# Patient Record
Sex: FEMALE | Race: BLACK OR AFRICAN AMERICAN | NOT HISPANIC OR LATINO
[De-identification: names, ages, dates, MRNs, and addresses within clinical notes are randomized per-mention and may not be internally consistent; named-entity substitution may affect disease eponyms.]

---

## 2020-11-21 ENCOUNTER — ASOB RESULT (OUTPATIENT)
Age: 30
End: 2020-11-21

## 2020-11-21 ENCOUNTER — APPOINTMENT (OUTPATIENT)
Dept: ANTEPARTUM | Facility: CLINIC | Age: 30
End: 2020-11-21
Payer: MEDICAID

## 2020-11-21 ENCOUNTER — TRANSCRIPTION ENCOUNTER (OUTPATIENT)
Age: 30
End: 2020-11-21

## 2020-11-21 PROBLEM — Z00.00 ENCOUNTER FOR PREVENTIVE HEALTH EXAMINATION: Status: ACTIVE | Noted: 2020-11-21

## 2020-11-21 PROCEDURE — 76813 OB US NUCHAL MEAS 1 GEST: CPT

## 2020-11-21 PROCEDURE — 99072 ADDL SUPL MATRL&STAF TM PHE: CPT

## 2020-11-21 PROCEDURE — 36416 COLLJ CAPILLARY BLOOD SPEC: CPT

## 2020-11-21 PROCEDURE — 76801 OB US < 14 WKS SINGLE FETUS: CPT

## 2021-01-22 ENCOUNTER — ASOB RESULT (OUTPATIENT)
Age: 31
End: 2021-01-22

## 2021-01-22 ENCOUNTER — APPOINTMENT (OUTPATIENT)
Dept: ANTEPARTUM | Facility: CLINIC | Age: 31
End: 2021-01-22
Payer: MEDICAID

## 2021-01-22 PROCEDURE — 76811 OB US DETAILED SNGL FETUS: CPT

## 2021-01-22 PROCEDURE — 99072 ADDL SUPL MATRL&STAF TM PHE: CPT

## 2021-05-10 ENCOUNTER — OUTPATIENT (OUTPATIENT)
Dept: INPATIENT UNIT | Facility: HOSPITAL | Age: 31
LOS: 1 days | Discharge: HOME | End: 2021-05-10
Payer: MEDICAID

## 2021-05-10 VITALS
DIASTOLIC BLOOD PRESSURE: 78 MMHG | HEART RATE: 112 BPM | RESPIRATION RATE: 16 BRPM | SYSTOLIC BLOOD PRESSURE: 135 MMHG | TEMPERATURE: 98 F

## 2021-05-10 VITALS — OXYGEN SATURATION: 90 % | HEART RATE: 92 BPM

## 2021-05-10 DIAGNOSIS — Z98.890 OTHER SPECIFIED POSTPROCEDURAL STATES: Chronic | ICD-10-CM

## 2021-05-10 DIAGNOSIS — O26.899 OTHER SPECIFIED PREGNANCY RELATED CONDITIONS, UNSPECIFIED TRIMESTER: ICD-10-CM

## 2021-05-10 DIAGNOSIS — Z3A.00 WEEKS OF GESTATION OF PREGNANCY NOT SPECIFIED: ICD-10-CM

## 2021-05-10 LAB
ALBUMIN SERPL ELPH-MCNC: 3.6 G/DL — SIGNIFICANT CHANGE UP (ref 3.3–5)
ALP SERPL-CCNC: 101 U/L — SIGNIFICANT CHANGE UP (ref 40–120)
ALT FLD-CCNC: 9 U/L — SIGNIFICANT CHANGE UP (ref 4–33)
ANION GAP SERPL CALC-SCNC: 13 MMOL/L — SIGNIFICANT CHANGE UP (ref 7–14)
AST SERPL-CCNC: 11 U/L — SIGNIFICANT CHANGE UP (ref 4–32)
BASOPHILS # BLD AUTO: 0.02 K/UL — SIGNIFICANT CHANGE UP (ref 0–0.2)
BASOPHILS NFR BLD AUTO: 0.1 % — SIGNIFICANT CHANGE UP (ref 0–2)
BILIRUB SERPL-MCNC: 0.2 MG/DL — SIGNIFICANT CHANGE UP (ref 0.2–1.2)
BUN SERPL-MCNC: 7 MG/DL — SIGNIFICANT CHANGE UP (ref 7–23)
CALCIUM SERPL-MCNC: 8.6 MG/DL — SIGNIFICANT CHANGE UP (ref 8.4–10.5)
CHLORIDE SERPL-SCNC: 102 MMOL/L — SIGNIFICANT CHANGE UP (ref 98–107)
CO2 SERPL-SCNC: 21 MMOL/L — LOW (ref 22–31)
CREAT SERPL-MCNC: 0.69 MG/DL — SIGNIFICANT CHANGE UP (ref 0.5–1.3)
EOSINOPHIL # BLD AUTO: 0.08 K/UL — SIGNIFICANT CHANGE UP (ref 0–0.5)
EOSINOPHIL NFR BLD AUTO: 0.6 % — SIGNIFICANT CHANGE UP (ref 0–6)
GLUCOSE SERPL-MCNC: 88 MG/DL — SIGNIFICANT CHANGE UP (ref 70–99)
HCT VFR BLD CALC: 31.1 % — LOW (ref 34.5–45)
HGB BLD-MCNC: 9.7 G/DL — LOW (ref 11.5–15.5)
IANC: 9.72 K/UL — HIGH (ref 1.5–8.5)
IMM GRANULOCYTES NFR BLD AUTO: 0.7 % — SIGNIFICANT CHANGE UP (ref 0–1.5)
LYMPHOCYTES # BLD AUTO: 18.4 % — SIGNIFICANT CHANGE UP (ref 13–44)
LYMPHOCYTES # BLD AUTO: 2.47 K/UL — SIGNIFICANT CHANGE UP (ref 1–3.3)
MCHC RBC-ENTMCNC: 29 PG — SIGNIFICANT CHANGE UP (ref 27–34)
MCHC RBC-ENTMCNC: 31.2 GM/DL — LOW (ref 32–36)
MCV RBC AUTO: 92.8 FL — SIGNIFICANT CHANGE UP (ref 80–100)
MONOCYTES # BLD AUTO: 1.06 K/UL — HIGH (ref 0–0.9)
MONOCYTES NFR BLD AUTO: 7.9 % — SIGNIFICANT CHANGE UP (ref 2–14)
NEUTROPHILS # BLD AUTO: 9.72 K/UL — HIGH (ref 1.8–7.4)
NEUTROPHILS NFR BLD AUTO: 72.3 % — SIGNIFICANT CHANGE UP (ref 43–77)
NRBC # BLD: 0 /100 WBCS — SIGNIFICANT CHANGE UP
NRBC # FLD: 0 K/UL — SIGNIFICANT CHANGE UP
PLATELET # BLD AUTO: 296 K/UL — SIGNIFICANT CHANGE UP (ref 150–400)
POTASSIUM SERPL-MCNC: 3.8 MMOL/L — SIGNIFICANT CHANGE UP (ref 3.5–5.3)
POTASSIUM SERPL-SCNC: 3.8 MMOL/L — SIGNIFICANT CHANGE UP (ref 3.5–5.3)
PROT SERPL-MCNC: 6.8 G/DL — SIGNIFICANT CHANGE UP (ref 6–8.3)
RBC # BLD: 3.35 M/UL — LOW (ref 3.8–5.2)
RBC # FLD: 13.2 % — SIGNIFICANT CHANGE UP (ref 10.3–14.5)
SODIUM SERPL-SCNC: 136 MMOL/L — SIGNIFICANT CHANGE UP (ref 135–145)
WBC # BLD: 13.45 K/UL — HIGH (ref 3.8–10.5)
WBC # FLD AUTO: 13.45 K/UL — HIGH (ref 3.8–10.5)

## 2021-05-10 PROCEDURE — 99214 OFFICE O/P EST MOD 30 MIN: CPT | Mod: 25

## 2021-05-10 PROCEDURE — 59025 FETAL NON-STRESS TEST: CPT | Mod: 26

## 2021-05-10 PROCEDURE — 93010 ELECTROCARDIOGRAM REPORT: CPT

## 2021-05-10 RX ORDER — ACETAMINOPHEN 500 MG
975 TABLET ORAL ONCE
Refills: 0 | Status: COMPLETED | OUTPATIENT
Start: 2021-05-10 | End: 2021-05-10

## 2021-05-10 RX ORDER — SIMETHICONE 80 MG/1
160 TABLET, CHEWABLE ORAL ONCE
Refills: 0 | Status: COMPLETED | OUTPATIENT
Start: 2021-05-10 | End: 2021-05-10

## 2021-05-10 RX ORDER — CITRIC ACID/SODIUM CITRATE 300-500 MG
30 SOLUTION, ORAL ORAL ONCE
Refills: 0 | Status: COMPLETED | OUTPATIENT
Start: 2021-05-10 | End: 2021-05-10

## 2021-05-10 RX ADMIN — Medication 30 MILLILITER(S): at 13:47

## 2021-05-10 RX ADMIN — Medication 975 MILLIGRAM(S): at 13:47

## 2021-05-10 RX ADMIN — Medication 975 MILLIGRAM(S): at 14:37

## 2021-05-10 RX ADMIN — SIMETHICONE 160 MILLIGRAM(S): 80 TABLET, CHEWABLE ORAL at 15:37

## 2021-05-10 NOTE — OB PROVIDER TRIAGE NOTE - NSOBPROVIDERNOTE_OBGYN_ALL_OB_FT
EKG ordered  cbc, cmp ordered  tylenol 95mg PO, bicitra 30 mg ordered EKG ordered  cbc, cmp ordered  tylenol 95mg PO, bicitra 30 mg ordered  simethicone 160 mg PO ordered EKG ordered  cbc, cmp ordered  tylenol 95mg PO, bicitra 30 mg ordered  simethicone 160 mg PO ordered    1625:  pt reports minimal relief with the simethicone  d/w Dr. Anaya  pt discharged home - pain likely related to gerd/gas pain  continue pepcid/gas x at home  keep next scheduled appointment on Thursday  daily kick counts reviewed  labor precautions reviewed

## 2021-05-10 NOTE — OB PROVIDER TRIAGE NOTE - NSHPLABSRESULTS_GEN_ALL_CORE
EKG - normal sinus rhythm, normal ECG EKG - normal sinus rhythm, normal ECG                          9.7    13.45 )-----------( 296      ( 10 May 2021 14:24 )             31.1     05-10    136  |  102  |  7   ----------------------------<  88  3.8   |  21<L>  |  0.69    Ca    8.6      10 May 2021 14:24    TPro  6.8  /  Alb  3.6  /  TBili  0.2  /  DBili  x   /  AST  11  /  ALT  9   /  AlkPhos  101  05-10

## 2021-05-10 NOTE — OB PROVIDER TRIAGE NOTE - NS_CHIEFCOMPLAINTOTHER_OBGYN_ALL_OB_FT
Sent from office for evaluation for decrease in fetal movement x 2. Reports she was at office today and had +FH x 2. Denies LOF, VB and reports GFM. Presents with c/o mid chest pain which radiates to back and shoulder x 3 days. Denies LOF, VB and reports GFM.

## 2021-05-10 NOTE — OB PROVIDER TRIAGE NOTE - ADDITIONAL INSTRUCTIONS
labor precautions reviewed  daily kick counts reviewed  f/u with next scheduled appointment  pepcid and gas x at home

## 2021-05-25 ENCOUNTER — OUTPATIENT (OUTPATIENT)
Dept: OUTPATIENT SERVICES | Facility: HOSPITAL | Age: 31
LOS: 1 days | Discharge: HOME | End: 2021-05-25
Payer: MEDICAID

## 2021-05-25 VITALS — SYSTOLIC BLOOD PRESSURE: 121 MMHG | HEART RATE: 65 BPM | DIASTOLIC BLOOD PRESSURE: 82 MMHG

## 2021-05-25 VITALS — RESPIRATION RATE: 16 BRPM | TEMPERATURE: 98 F

## 2021-05-25 DIAGNOSIS — O26.899 OTHER SPECIFIED PREGNANCY RELATED CONDITIONS, UNSPECIFIED TRIMESTER: ICD-10-CM

## 2021-05-25 DIAGNOSIS — Z3A.00 WEEKS OF GESTATION OF PREGNANCY NOT SPECIFIED: ICD-10-CM

## 2021-05-25 DIAGNOSIS — Z98.890 OTHER SPECIFIED POSTPROCEDURAL STATES: Chronic | ICD-10-CM

## 2021-05-25 PROCEDURE — 76818 FETAL BIOPHYS PROFILE W/NST: CPT | Mod: 26

## 2021-05-25 PROCEDURE — 99214 OFFICE O/P EST MOD 30 MIN: CPT | Mod: 25

## 2021-05-25 RX ORDER — ACETAMINOPHEN 500 MG
975 TABLET ORAL ONCE
Refills: 0 | Status: COMPLETED | OUTPATIENT
Start: 2021-05-25 | End: 2021-05-25

## 2021-05-25 RX ADMIN — Medication 975 MILLIGRAM(S): at 02:58

## 2021-05-25 NOTE — OB PROVIDER TRIAGE NOTE - ADDITIONAL INSTRUCTIONS
-Patient will follow up with next scheduled appointment  -Fetal kick counts reviewed with patient  -labor precautions reviewed with patient

## 2021-05-25 NOTE — OB PROVIDER TRIAGE NOTE - NSHPPHYSICALEXAM_GEN_ALL_CORE
Vital Signs Last 24 Hrs  T(C): 36.9 (25 May 2021 02:24), Max: 36.9 (25 May 2021 02:20)  T(F): 98.4 (25 May 2021 02:24), Max: 98.42 (25 May 2021 02:20)  HR: 83 (25 May 2021 02:53) (83 - 86)  BP: 142/80 (25 May 2021 02:53) (120/76 - 142/80)  BP(mean): --  RR: 16 (25 May 2021 02:24) (16 - 16)  SpO2: --    Abdomen: soft, non tender. no guarding or rebound tenderness  SVE: 3/50/-3  TAS: vertex presentation    NST reactive with moderate variability, cat 1  toco ctx 6-11 minutes

## 2021-05-25 NOTE — OB RN TRIAGE NOTE - LIVING CHILDREN, OB PROFILE
2 Partial Purse String (Simple) Text: Given the location of the defect and the characteristics of the surrounding skin a simple purse string closure was deemed most appropriate.  Undermining was performed circumferentially around the surgical defect.  A purse string suture was then placed and tightened. Wound tension of the circular defect prevented complete closure of the wound.

## 2021-05-25 NOTE — OB PROVIDER TRIAGE NOTE - HISTORY OF PRESENT ILLNESS
31 y/o pt 39.2 weeks  presents to triage with c/o irregular, painful contractions and increased pelvic pressure. pt reports 6/10 pain with contractions. pt denies LOF and bleeding. pt denies n/v/d, fever or chills. pt endorses +fetal movement. pt reports that SVE on 21 was 3m. pt also reports HA since yesterday. pt denies any visual disturbances or epigastric pain, pt denies any elevated blood pressures during pregnancy  AP uncomplicated thus far    NKDA  PMH: denies   PSH: denies  OB:    2008 FT 7#   03/15/2014 FT 8#5 PEC  SAB x4 incomplete d&c x4  GYN: denies  Social hx: denies  medications:  PNV  Ferrous sulfate  Aspirin 81mg

## 2021-05-25 NOTE — OB PROVIDER TRIAGE NOTE - NSOBPROVIDERNOTE_OBGYN_ALL_OB_FT
29 y/o pt 39.2 weeks  presents in early labor  Plan:  -Tylenol 975mg given for HA  -will monitor blood pressures 31 y/o pt 39.2 weeks  presents in early labor  Plan:  -Tylenol 975mg given for HA  -will monitor blood pressures    @0348  Pt reports pain relief from Tylenol, pt reports 4/10 pain  No evidence of active labor   Maternal and fetal status reassuring  d/w Dr Schultz   -Patient cleared for discharge  -Patient will follow up with next scheduled appointment  -Fetal kick counts reviewed with patient  -labor precautions reviewed with patient  -Written and verbal instructions given to patient, patient verbalizes understanding of all information given

## 2021-05-27 ENCOUNTER — TRANSCRIPTION ENCOUNTER (OUTPATIENT)
Age: 31
End: 2021-05-27

## 2021-05-27 ENCOUNTER — INPATIENT (INPATIENT)
Facility: HOSPITAL | Age: 31
LOS: 0 days | Discharge: HOME | End: 2021-05-28
Attending: OBSTETRICS & GYNECOLOGY | Admitting: OBSTETRICS & GYNECOLOGY

## 2021-05-27 VITALS
DIASTOLIC BLOOD PRESSURE: 67 MMHG | TEMPERATURE: 99 F | HEART RATE: 92 BPM | RESPIRATION RATE: 16 BRPM | SYSTOLIC BLOOD PRESSURE: 122 MMHG

## 2021-05-27 DIAGNOSIS — Z3A.00 WEEKS OF GESTATION OF PREGNANCY NOT SPECIFIED: ICD-10-CM

## 2021-05-27 DIAGNOSIS — O26.899 OTHER SPECIFIED PREGNANCY RELATED CONDITIONS, UNSPECIFIED TRIMESTER: ICD-10-CM

## 2021-05-27 DIAGNOSIS — Z98.890 OTHER SPECIFIED POSTPROCEDURAL STATES: Chronic | ICD-10-CM

## 2021-05-27 LAB
BASOPHILS # BLD AUTO: 0.04 K/UL — SIGNIFICANT CHANGE UP (ref 0–0.2)
BASOPHILS NFR BLD AUTO: 0.3 % — SIGNIFICANT CHANGE UP (ref 0–2)
BLD GP AB SCN SERPL QL: NEGATIVE — SIGNIFICANT CHANGE UP
COVID-19 SPIKE DOMAIN AB INTERP: NEGATIVE — SIGNIFICANT CHANGE UP
COVID-19 SPIKE DOMAIN ANTIBODY RESULT: 0.4 U/ML — SIGNIFICANT CHANGE UP
EOSINOPHIL # BLD AUTO: 0.05 K/UL — SIGNIFICANT CHANGE UP (ref 0–0.5)
EOSINOPHIL NFR BLD AUTO: 0.3 % — SIGNIFICANT CHANGE UP (ref 0–6)
HCT VFR BLD CALC: 31.3 % — LOW (ref 34.5–45)
HGB BLD-MCNC: 10.2 G/DL — LOW (ref 11.5–15.5)
IANC: 10.42 K/UL — HIGH (ref 1.5–8.5)
IMM GRANULOCYTES NFR BLD AUTO: 1 % — SIGNIFICANT CHANGE UP (ref 0–1.5)
LYMPHOCYTES # BLD AUTO: 18.6 % — SIGNIFICANT CHANGE UP (ref 13–44)
LYMPHOCYTES # BLD AUTO: 2.74 K/UL — SIGNIFICANT CHANGE UP (ref 1–3.3)
MCHC RBC-ENTMCNC: 29.3 PG — SIGNIFICANT CHANGE UP (ref 27–34)
MCHC RBC-ENTMCNC: 32.6 GM/DL — SIGNIFICANT CHANGE UP (ref 32–36)
MCV RBC AUTO: 89.9 FL — SIGNIFICANT CHANGE UP (ref 80–100)
MONOCYTES # BLD AUTO: 1.32 K/UL — HIGH (ref 0–0.9)
MONOCYTES NFR BLD AUTO: 9 % — SIGNIFICANT CHANGE UP (ref 2–14)
NEUTROPHILS # BLD AUTO: 10.42 K/UL — HIGH (ref 1.8–7.4)
NEUTROPHILS NFR BLD AUTO: 70.8 % — SIGNIFICANT CHANGE UP (ref 43–77)
NRBC # BLD: 0 /100 WBCS — SIGNIFICANT CHANGE UP
NRBC # FLD: 0 K/UL — SIGNIFICANT CHANGE UP
PLATELET # BLD AUTO: 271 K/UL — SIGNIFICANT CHANGE UP (ref 150–400)
RBC # BLD: 3.48 M/UL — LOW (ref 3.8–5.2)
RBC # FLD: 13.3 % — SIGNIFICANT CHANGE UP (ref 10.3–14.5)
RH IG SCN BLD-IMP: POSITIVE — SIGNIFICANT CHANGE UP
RH IG SCN BLD-IMP: POSITIVE — SIGNIFICANT CHANGE UP
SARS-COV-2 IGG+IGM SERPL QL IA: 0.4 U/ML — SIGNIFICANT CHANGE UP
SARS-COV-2 IGG+IGM SERPL QL IA: NEGATIVE — SIGNIFICANT CHANGE UP
SARS-COV-2 RNA SPEC QL NAA+PROBE: SIGNIFICANT CHANGE UP
T PALLIDUM AB TITR SER: NEGATIVE — SIGNIFICANT CHANGE UP
WBC # BLD: 14.71 K/UL — HIGH (ref 3.8–10.5)
WBC # FLD AUTO: 14.71 K/UL — HIGH (ref 3.8–10.5)

## 2021-05-27 RX ORDER — AER TRAVELER 0.5 G/1
1 SOLUTION RECTAL; TOPICAL EVERY 4 HOURS
Refills: 0 | Status: DISCONTINUED | OUTPATIENT
Start: 2021-05-27 | End: 2021-05-28

## 2021-05-27 RX ORDER — SODIUM CHLORIDE 9 MG/ML
1000 INJECTION, SOLUTION INTRAVENOUS
Refills: 0 | Status: DISCONTINUED | OUTPATIENT
Start: 2021-05-27 | End: 2021-05-27

## 2021-05-27 RX ORDER — ACETAMINOPHEN 500 MG
3 TABLET ORAL
Qty: 0 | Refills: 0 | DISCHARGE
Start: 2021-05-27

## 2021-05-27 RX ORDER — SENNA PLUS 8.6 MG/1
1 TABLET ORAL
Refills: 0 | Status: DISCONTINUED | OUTPATIENT
Start: 2021-05-27 | End: 2021-05-28

## 2021-05-27 RX ORDER — TETANUS TOXOID, REDUCED DIPHTHERIA TOXOID AND ACELLULAR PERTUSSIS VACCINE, ADSORBED 5; 2.5; 8; 8; 2.5 [IU]/.5ML; [IU]/.5ML; UG/.5ML; UG/.5ML; UG/.5ML
0.5 SUSPENSION INTRAMUSCULAR ONCE
Refills: 0 | Status: DISCONTINUED | OUTPATIENT
Start: 2021-05-27 | End: 2021-05-28

## 2021-05-27 RX ORDER — IBUPROFEN 200 MG
600 TABLET ORAL EVERY 6 HOURS
Refills: 0 | Status: DISCONTINUED | OUTPATIENT
Start: 2021-05-27 | End: 2021-05-28

## 2021-05-27 RX ORDER — LANOLIN
1 OINTMENT (GRAM) TOPICAL EVERY 6 HOURS
Refills: 0 | Status: DISCONTINUED | OUTPATIENT
Start: 2021-05-27 | End: 2021-05-28

## 2021-05-27 RX ORDER — OXYTOCIN 10 UNIT/ML
333.33 VIAL (ML) INJECTION
Qty: 20 | Refills: 0 | Status: DISCONTINUED | OUTPATIENT
Start: 2021-05-27 | End: 2021-05-27

## 2021-05-27 RX ORDER — SODIUM CHLORIDE 9 MG/ML
3 INJECTION INTRAMUSCULAR; INTRAVENOUS; SUBCUTANEOUS EVERY 8 HOURS
Refills: 0 | Status: DISCONTINUED | OUTPATIENT
Start: 2021-05-27 | End: 2021-05-28

## 2021-05-27 RX ORDER — SODIUM CHLORIDE 9 MG/ML
1000 INJECTION INTRAMUSCULAR; INTRAVENOUS; SUBCUTANEOUS
Refills: 0 | Status: DISCONTINUED | OUTPATIENT
Start: 2021-05-27 | End: 2021-05-27

## 2021-05-27 RX ORDER — BENZOYL PEROXIDE MICRONIZED 5.8 %
1 TOWELETTE (EA) TOPICAL
Qty: 0 | Refills: 0 | DISCHARGE

## 2021-05-27 RX ORDER — OXYCODONE HYDROCHLORIDE 5 MG/1
5 TABLET ORAL ONCE
Refills: 0 | Status: DISCONTINUED | OUTPATIENT
Start: 2021-05-27 | End: 2021-05-28

## 2021-05-27 RX ORDER — SIMETHICONE 80 MG/1
80 TABLET, CHEWABLE ORAL EVERY 4 HOURS
Refills: 0 | Status: DISCONTINUED | OUTPATIENT
Start: 2021-05-27 | End: 2021-05-28

## 2021-05-27 RX ORDER — ACETAMINOPHEN 500 MG
975 TABLET ORAL
Refills: 0 | Status: DISCONTINUED | OUTPATIENT
Start: 2021-05-27 | End: 2021-05-28

## 2021-05-27 RX ORDER — SODIUM CHLORIDE 9 MG/ML
500 INJECTION INTRAMUSCULAR; INTRAVENOUS; SUBCUTANEOUS ONCE
Refills: 0 | Status: COMPLETED | OUTPATIENT
Start: 2021-05-27 | End: 2021-05-27

## 2021-05-27 RX ORDER — PRAMOXINE HYDROCHLORIDE 150 MG/15G
1 AEROSOL, FOAM RECTAL EVERY 4 HOURS
Refills: 0 | Status: DISCONTINUED | OUTPATIENT
Start: 2021-05-27 | End: 2021-05-28

## 2021-05-27 RX ORDER — BENZOCAINE 10 %
1 GEL (GRAM) MUCOUS MEMBRANE EVERY 6 HOURS
Refills: 0 | Status: DISCONTINUED | OUTPATIENT
Start: 2021-05-27 | End: 2021-05-28

## 2021-05-27 RX ORDER — OXYCODONE HYDROCHLORIDE 5 MG/1
5 TABLET ORAL
Refills: 0 | Status: DISCONTINUED | OUTPATIENT
Start: 2021-05-27 | End: 2021-05-28

## 2021-05-27 RX ORDER — DIPHENHYDRAMINE HCL 50 MG
25 CAPSULE ORAL EVERY 6 HOURS
Refills: 0 | Status: DISCONTINUED | OUTPATIENT
Start: 2021-05-27 | End: 2021-05-28

## 2021-05-27 RX ORDER — MAGNESIUM HYDROXIDE 400 MG/1
30 TABLET, CHEWABLE ORAL
Refills: 0 | Status: DISCONTINUED | OUTPATIENT
Start: 2021-05-27 | End: 2021-05-28

## 2021-05-27 RX ORDER — KETOROLAC TROMETHAMINE 30 MG/ML
30 SYRINGE (ML) INJECTION ONCE
Refills: 0 | Status: DISCONTINUED | OUTPATIENT
Start: 2021-05-27 | End: 2021-05-27

## 2021-05-27 RX ORDER — IBUPROFEN 200 MG
600 TABLET ORAL EVERY 6 HOURS
Refills: 0 | Status: COMPLETED | OUTPATIENT
Start: 2021-05-27 | End: 2022-04-25

## 2021-05-27 RX ORDER — DIBUCAINE 1 %
1 OINTMENT (GRAM) RECTAL EVERY 6 HOURS
Refills: 0 | Status: DISCONTINUED | OUTPATIENT
Start: 2021-05-27 | End: 2021-05-28

## 2021-05-27 RX ORDER — IBUPROFEN 200 MG
1 TABLET ORAL
Qty: 0 | Refills: 0 | DISCHARGE
Start: 2021-05-27

## 2021-05-27 RX ORDER — HYDROCORTISONE 1 %
1 OINTMENT (GRAM) TOPICAL EVERY 6 HOURS
Refills: 0 | Status: DISCONTINUED | OUTPATIENT
Start: 2021-05-27 | End: 2021-05-28

## 2021-05-27 RX ADMIN — Medication 975 MILLIGRAM(S): at 21:00

## 2021-05-27 RX ADMIN — Medication 600 MILLIGRAM(S): at 23:40

## 2021-05-27 RX ADMIN — SODIUM CHLORIDE 125 MILLILITER(S): 9 INJECTION INTRAMUSCULAR; INTRAVENOUS; SUBCUTANEOUS at 05:26

## 2021-05-27 RX ADMIN — Medication 600 MILLIGRAM(S): at 15:45

## 2021-05-27 RX ADMIN — SODIUM CHLORIDE 1000 MILLILITER(S): 9 INJECTION INTRAMUSCULAR; INTRAVENOUS; SUBCUTANEOUS at 05:02

## 2021-05-27 RX ADMIN — Medication 975 MILLIGRAM(S): at 20:30

## 2021-05-27 RX ADMIN — Medication 600 MILLIGRAM(S): at 17:03

## 2021-05-27 RX ADMIN — Medication 1000 MILLIUNIT(S)/MIN: at 05:41

## 2021-05-27 RX ADMIN — Medication 30 MILLIGRAM(S): at 07:59

## 2021-05-27 RX ADMIN — Medication 30 MILLIGRAM(S): at 06:42

## 2021-05-27 NOTE — OB PROVIDER TRIAGE NOTE - HISTORY OF PRESENT ILLNESS
29 y/o pt 39.4 weeks  presents to triage with c/o painful contractions every 5 minutes. pt reports 8/10 pain with contraction. pt denies any lof or bleeding. pt denies n/v/d, fever or chills. pt endorses +fetal movement  AP uncomplicated thus far    NKDA  PMH:   Migraines  PSH: denies  OB:   2008 FT 7#   03/15/2015 FT 8#5  SAB x3 incomplete D&C x3   GYN: denies  Social hx: denies  Medications: PNV  Ferrous sulfate BID

## 2021-05-27 NOTE — OB RN DELIVERY SUMMARY - NSSELHIDDEN_OBGYN_ALL_OB_FT
[NS_DeliveryAttending1_OBGYN_ALL_OB_FT:[email or web address]=],[NS_DeliveryRN_OBGYN_ALL_OB_FT:[id number]=]

## 2021-05-27 NOTE — DISCHARGE NOTE OB - PATIENT PORTAL LINK FT
You can access the FollowMyHealth Patient Portal offered by [facility] by registering at the following website: [email or web address]. By joining [employer]’s FollowMyHealth portal, you will also be able to view your health information using other applications (apps) compatible with our system.

## 2021-05-27 NOTE — OB RN DELIVERY SUMMARY - NS_SEPSISRSKCALC_OBGYN_ALL_OB_FT
EOS calculated successfully. EOS Risk Factor: 0.5/1000 live births ([facility] national incidence); GA=39w4d; Temp=98.8; ROM=1.933; GBS='Negative'; Antibiotics='No antibiotics or any antibiotics < 2 hrs prior to birth'

## 2021-05-27 NOTE — DISCHARGE NOTE OB - MATERIALS PROVIDED
Vaccinations/[facility]  Screening Program/  Immunization Record/Breastfeeding Log/Guide to Postpartum Care/[facility] Hearing Screen Program/Back To Sleep Handout/Shaken Baby Prevention Handout/Tdap Vaccination (VIS Pub Date: 2012)

## 2021-05-27 NOTE — OB PROVIDER TRIAGE NOTE - NSHPPHYSICALEXAM_GEN_ALL_CORE
Vital Signs Last 24 Hrs  T(C): 37.1 (27 May 2021 00:59), Max: 37.1 (27 May 2021 00:59)  T(F): 98.8 (27 May 2021 00:59), Max: 98.8 (27 May 2021 00:59)  HR: 92 (27 May 2021 01:09) (92 - 92)  BP: 122/67 (27 May 2021 01:09) (122/67 - 122/67)  BP(mean): --  RR: 16 (27 May 2021 00:59) (16 - 16)  SpO2: --    Abdomen: soft, non tender. no guarding or rebound tenderness  SVE: 4/70/-3  TAS: vertex presentation  EFW 3317gm by Leopold's     NST in progress

## 2021-05-27 NOTE — OB RN PATIENT PROFILE - NS PRO TDAP RECEIVED Y/N
Problem: Goal Outcome Summary  Goal: Goal Outcome Summary  Outcome: Improving  A&Ox4. VSS on room air. Up with assist x1. Creatinine trending down- 1.31. Tolerating renal diet. , 170. SSI as ordered. Voiding in urinal at bedside. Adequate output. Need stool sample- hats in toilet. IVF infusing at 75mL/hr. ECHO yesterday showing decreased heart function. Cardiology consulted. Plan for discharge in 1-2 days pending renal function and input from cardiologist. Will continue to monitor.          yes...

## 2021-05-27 NOTE — DISCHARGE NOTE OB - CARE PROVIDER_API CALL
Armond Anaya)  Obstetrics and Gynecology Obstetrics and Gynocology  [address] [place] [address]  Phone: [phone]  Fax: [phone]  Follow Up Time: 1 month

## 2021-05-27 NOTE — OB PROVIDER DELIVERY SUMMARY - NSPROVIDERDELIVERYNOTE_OBGYN_ALL_OB_FT
Delivery of liveborn male infant from JEROME position complicated by left shoulder dystocia. Bed was lowered, shoulder dystocia code was called, patient was placed in Isaiah position and delivery of posterior arm relieved shoulder dystocia, lasting less than 10 seconds. Head, shoulders, and body delivered tight nuchal x 2.  Infant was suctioned. No mec. Delayed cord clamping and infant was passed to mother. Cord clamped and cut. Placenta delivered intact with a 3 vessel cord. Fundal massage was given and uterine fundus was found to be firm. Vaginal exam revealed an intact cervix, vaginal walls and sulci. Excellent hemostasis was noted. Patient was stable and went to recovery. Count was correct x 2.

## 2021-05-27 NOTE — OB NEONATOLOGY/PEDIATRICIAN DELIVERY SUMMARY - NSPEDSNEONOTESA_OBGYN_ALL_OB_FT
Baby Alvin Couch was born at 39.4 wks via  to a 31 y/o O+ . COVID-19 pending. Maternal history of migraines. Pregnancy/prenatal history unremarkable. PNL neg/NR/immune. GBS neg on 4/15. Antibiotics were not given. AROM at 2 hours with clear fluid. Neonatology called for possible shoulder dystocia. Baby born crying and vigorous, and was warmed, dried, stimulated, and suctioned. Baby was noted to have symmetric upper extremities, moving symmetrically also. APGARS 8 and 9. EOS  (Maternal T 36.9C). Will be . Guardian consents to circumcision. Guardian consents to Hep B.     Physical Exam:  Gen: awake, alert, active  HEENT: anterior fontanel open soft and flat, no cleft lip/palate, ears normal set, no ear pits or tags, no lesions in anterior oropharynx, red reflex deferred, nares clinically patent  Resp: good air entry and clear to auscultation bilaterally  Cardiac: Normal S1/S2, regular rate and rhythm, no murmurs, rubs or gallops, 2+ femoral pulses bilaterally  Abd: soft, non tender, non distended, normal bowel sounds, 3-vessel cord  Neuro: +grasp/get, normal tone  Extremities: negative harris and ortolani, full range of motion x 4, no crepitus  Skin: pink  Spine: no sacral dimple  Genital Exam: testes palpable bilaterally, normal male anatomy, jonny 1, anus patent

## 2021-05-27 NOTE — OB PROVIDER H&P - ASSESSMENT
31 y/o pt 39.4 weeks  presents in active labor  d/w with Dr Anaya  Plan:   -Admit l&d. Routine labs   -Expectant management of labor  -Fetus: cat 1 tracing, vertex presentation, continuous monitoring  -GBS negative  -Pain: patient approved for epidural   -Covid 19 pending for patient and support person  -Consents signed and witnessed at bedside

## 2021-05-27 NOTE — OB PROVIDER H&P - PROBLEM SELECTOR PLAN 1
-Admit l&d. Routine labs   -Expectant management of labor  -Fetus: cat 1 tracing, vertex presentation, continuous monitoring  -GBS negative  -Pain: patient approved for epidural   -Covid 19 pending for patient and support person  -Consents signed and witnessed at bedside

## 2021-05-27 NOTE — OB PROVIDER TRIAGE NOTE - NSOBPROVIDERNOTE_OBGYN_ALL_OB_FT
29 y/o pt 39.4 weeks  presents in active labor  d/w with Dr Anaya  Plan:   -Admit l&d. Routine labs   -Expectant management of labor  -Fetus: cat 1 tracing, vertex presentation, continuous monitoring  -GBS negative  -Pain: patient approved for epidural   -Covid 19 pending for patient and support person  -Consents signed and witnessed at bedside

## 2021-05-27 NOTE — OB PROVIDER TRIAGE NOTE - NS_FETALANOMAL_OBGYN_ALL_OB
"-- DO NOT REPLY / DO NOT REPLY ALL --  -- Message is from the [employer]--    COVID-19 Universal Screening: Positive    General Patient Message      Reason for Call: Urgent disposition for pos COVID w/SOB. Pt declined virtual visit/ICC. Pt is requesting medication for her symptoms that can help her. Uses [facility][phone]  FAX: [phone]. Pt contact # is: [phone]. Caller Information       Type Contact Phone    12/10/2020 12:57 PM CST Phone (Incoming) Margarito Back (Self) [phone] (H)          Alternative phone number:     Turnaround time given to caller: ""This message will be sent to [facility] Provider's name]. The clinical team will fulfill your request as soon as they review your message. \""    " No

## 2021-05-27 NOTE — CHART NOTE - NSCHARTNOTEFT_GEN_A_CORE
pt was evaluated at bedside s/p epidural   ve: 5: 80/-3 IUPC placed AROM clear   cat 1 tracing   Pitocin augmentation as indicated   anticipate vaginal delivery
pt was evaluated at bedside for intermittent variable decelerations  ve: 8/100/0  fse/ iupc placed   start amnioinfusion   conservative resuscitative measures   anticipate vaginal delivery

## 2021-05-27 NOTE — OB RN TRIAGE NOTE - PMH
No pertinent past medical history <<----- Click to add NO pertinent Past Medical History Migraines

## 2021-05-28 VITALS
TEMPERATURE: 98 F | SYSTOLIC BLOOD PRESSURE: 110 MMHG | RESPIRATION RATE: 20 BRPM | OXYGEN SATURATION: 100 % | DIASTOLIC BLOOD PRESSURE: 64 MMHG | HEART RATE: 64 BPM

## 2021-05-28 RX ADMIN — Medication 975 MILLIGRAM(S): at 08:18

## 2021-05-28 RX ADMIN — Medication 975 MILLIGRAM(S): at 09:47

## 2021-05-28 RX ADMIN — Medication 600 MILLIGRAM(S): at 05:25

## 2021-05-28 RX ADMIN — Medication 600 MILLIGRAM(S): at 00:10

## 2021-05-28 NOTE — PROGRESS NOTE ADULT - ASSESSMENT
ppd 1 s/p  recovering appropriately   [] continue routine postpartum care   [] encourage ambulation   [] continue pain management PRN   [] discharge today

## 2021-05-28 NOTE — PROGRESS NOTE ADULT - SUBJECTIVE AND OBJECTIVE BOX
INTERVAL HPI/OVERNIGHT EVENTS: Pt seen and examined at bedside.  Doing well, denies complaints. +voiding without difficulty, +ambulation, joshua reg diet. denies heavy lochia     MEDICATIONS  (STANDING):  acetaminophen   Tablet .. 975 milliGRAM(s) Oral <User Schedule>  diphtheria/tetanus/pertussis (acellular) Vaccine (ADAcel) 0.5 milliLiter(s) IntraMuscular once  ibuprofen  Tablet. 600 milliGRAM(s) Oral every 6 hours  prenatal multivitamin 1 Tablet(s) Oral daily  sodium chloride 0.9% lock flush 3 milliLiter(s) IV Push every 8 hours    MEDICATIONS  (PRN):  benzocaine 20%/menthol 0.5% Spray 1 Spray(s) Topical every 6 hours PRN for Perineal discomfort  dibucaine 1% Ointment 1 Application(s) Topical every 6 hours PRN Perineal discomfort  diphenhydrAMINE 25 milliGRAM(s) Oral every 6 hours PRN Pruritus  hydrocortisone 1% Cream 1 Application(s) Topical every 6 hours PRN Moderate Pain (4-6)  lanolin Ointment 1 Application(s) Topical every 6 hours PRN nipple soreness  magnesium hydroxide Suspension 30 milliLiter(s) Oral two times a day PRN Constipation  oxyCODONE    IR 5 milliGRAM(s) Oral every 3 hours PRN Moderate to Severe Pain (4-10)  oxyCODONE    IR 5 milliGRAM(s) Oral once PRN Moderate to Severe Pain (4-10)  pramoxine 1%/zinc 5% Cream 1 Application(s) Topical every 4 hours PRN Moderate Pain (4-6)  senna 1 Tablet(s) Oral two times a day PRN Constipation  simethicone 80 milliGRAM(s) Chew every 4 hours PRN Gas  witch hazel Pads 1 Application(s) Topical every 4 hours PRN Perineal discomfort      Vital Signs Last 24 Hrs  T(C): 36.7 (28 May 2021 05:54), Max: 36.7 (27 May 2021 21:28)  T(F): 98 (28 May 2021 05:54), Max: 98.1 (27 May 2021 21:28)  HR: 68 (28 May 2021 05:54) (63 - 78)  BP: 115/65 (28 May 2021 05:54) (115/65 - 127/76)  BP(mean): --  RR: 18 (28 May 2021 05:54) (16 - 18)  SpO2: 100% (28 May 2021 05:54) (98% - 100%)    PHYSICAL EXAM:    GA: NAD, A+0 x 3  Abd: ( + ) BS, soft, nontender, nondistended, no rebound or guarding,   Uterus: Fundus midline; firm    LABS:                        10.2   14.71 )-----------( 271      ( 27 May 2021 02:52 )             31.3                   RADIOLOGY & ADDITIONAL TESTS:

## 2021-06-25 NOTE — OB PROVIDER TRIAGE NOTE - NS_FETALPRESSONO_OBGYN_ALL_OB
What Is The Reason For Today's Visit?: Surveillance against skin cancer recurrences How Many Skin Cancers Have You Had?: one When Was Your Last Cancer Diagnosed?: 2004 Cephalic

## 2022-09-27 ENCOUNTER — NON-APPOINTMENT (OUTPATIENT)
Age: 32
End: 2022-09-27

## 2022-09-27 PROBLEM — G43.909 MIGRAINE, UNSPECIFIED, NOT INTRACTABLE, WITHOUT STATUS MIGRAINOSUS: Chronic | Status: ACTIVE | Noted: 2021-05-27

## 2022-10-03 NOTE — REASON FOR VISIT
[Initial Visit ___] : an initial visit for [unfilled] [Source: ______] : History obtained from [unfilled] [Questionnaire Received] : Patient questionnaire received [Intake Form Reviewed] : Patient intake form with past medical history, surgical history, family history and social history reviewed today

## 2022-10-04 ENCOUNTER — NON-APPOINTMENT (OUTPATIENT)
Age: 32
End: 2022-10-04

## 2022-10-04 ENCOUNTER — APPOINTMENT (OUTPATIENT)
Dept: UROGYNECOLOGY | Facility: CLINIC | Age: 32
End: 2022-10-04

## 2022-10-04 ENCOUNTER — LABORATORY RESULT (OUTPATIENT)
Age: 32
End: 2022-10-04

## 2022-10-04 VITALS
TEMPERATURE: 96.9 F | DIASTOLIC BLOOD PRESSURE: 80 MMHG | BODY MASS INDEX: 31.49 KG/M2 | HEIGHT: 65 IN | WEIGHT: 189 LBS | SYSTOLIC BLOOD PRESSURE: 130 MMHG

## 2022-10-04 DIAGNOSIS — Z80.3 FAMILY HISTORY OF MALIGNANT NEOPLASM OF BREAST: ICD-10-CM

## 2022-10-04 DIAGNOSIS — R35.0 FREQUENCY OF MICTURITION: ICD-10-CM

## 2022-10-04 DIAGNOSIS — Z82.49 FAMILY HISTORY OF ISCHEMIC HEART DISEASE AND OTHER DISEASES OF THE CIRCULATORY SYSTEM: ICD-10-CM

## 2022-10-04 DIAGNOSIS — Z78.9 OTHER SPECIFIED HEALTH STATUS: ICD-10-CM

## 2022-10-04 DIAGNOSIS — Z80.0 FAMILY HISTORY OF MALIGNANT NEOPLASM OF DIGESTIVE ORGANS: ICD-10-CM

## 2022-10-04 DIAGNOSIS — Z80.42 FAMILY HISTORY OF MALIGNANT NEOPLASM OF PROSTATE: ICD-10-CM

## 2022-10-04 LAB
BILIRUB UR QL STRIP: NORMAL
CLARITY UR: CLEAR
GLUCOSE UR-MCNC: NORMAL
HCG UR QL: 0.2 EU/DL
HGB UR QL STRIP.AUTO: NORMAL
KETONES UR-MCNC: NORMAL
LEUKOCYTE ESTERASE UR QL STRIP: NORMAL
NITRITE UR QL STRIP: NORMAL
PH UR STRIP: 7
PROT UR STRIP-MCNC: NORMAL
SP GR UR STRIP: 1.02

## 2022-10-04 PROCEDURE — 51701 INSERT BLADDER CATHETER: CPT

## 2022-10-04 PROCEDURE — 99204 OFFICE O/P NEW MOD 45 MIN: CPT | Mod: 25

## 2022-10-04 NOTE — HISTORY OF PRESENT ILLNESS
[FreeTextEntry1] : ARIANA COTTON  is a 32 year old P3 LMP , presenting for evaluation of prolapse and urinary symptoms. Patient reports sensation of vaginal bulge that started during her last pregnancy approximately 1.5 years ago. She feels bulge more during her menses and it is uncomfortable. She is sexually active and denies any dyspareunia or issues with sex due to the prolapse. \par \par She also reports urinary frequency and urgency. She denies any urgency incontinence but does have rare stress incontinence. No dysuria or history of hematuria. 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 She reports that she is done with childbearing and would want to have a sterilization procedure.\par \par Daytime voids: 10 \par Nighttime voids: 2\par \par Fluid intake:\par Water: 3 cans sparking /day\par Coffee or Tea: 1 cup/day\par \par PMH: denies\par PSH: D&C x 2\par Allergies: NKDA\par Meds: none\par OBx: , \par Social Hx: no toxic habits\par Family Hx: father with colon and prostate CA (diagnosed at age 72), paternal grandmother passed of breast CA\par

## 2022-10-04 NOTE — PROCEDURE
[Straight Catheterization] : insertion of a straight catheter [Patient] : the patient [Allergies Reviewed] : Allergies reviewed [None] : none [___ Fr Straight Tip] : a [unfilled] in [place] straight tip catheter [Urinary Frequency] : urinary frequency [Clear] : clear [Other ____] :  [unfilled] [No Complications] : no complications [Tolerated Well] : the patient tolerated the procedure well [0] : 0

## 2022-10-04 NOTE — DISCUSSION/SUMMARY
[FreeTextEntry1] : #Stage II POP\par - I counseled Domenica on the risk factors and etiologies of pelvic organ prolapse. Computer generated images were used to demonstrate the degree of the patient's prolapse. Management options were discussed which included continued observation, Kegels/pelvic floor therapy, pessary, and surgery (both native tissue repair as well as graft augmented reconstruction). \par - She desires surgical management, unsure of approach as this time. We discussed necessary steps/evaluation needed prior to surgical intervention. Will schedule urodynamic test and well as follow-up visit to further discuss surgical options. Will also need pelvic US.\par - IUGA patient handout given on POP and surgical interventions discussed.\par \par #Urinary frequency\par - Trace blood noted on dipstick, will f/u UA and UCx\par \par RTC after UDS

## 2022-10-04 NOTE — PHYSICAL EXAM
[Aa ____] : Aa [unfilled] [Ba ____] : Ba [unfilled] [C ____] : C [unfilled] [GH ____] : GH [unfilled] [PB ____] : PB [unfilled] [TVL ____] : TVL  [unfilled] [Ap ____] : Ap [unfilled] [Bp ____] : Bp [unfilled] [D ____] : D [unfilled] [FreeTextEntry1] : General: normal, well-developed, well-nourished, no acute distress\par Respiratory: normal respiratory effort, no use accessory muscle\par Abdomen: no masses, no rebound and no tenderness\par Pelvic:\par      External: no lesions, no masses\par      Urethra: meatus normal appearing, no lesions, polyps, caruncles\par      Vagina: normal rugae, no masses, no discharge and no bleeding\par      Cervix: grossly normal appearing, no lesions\par      Uterus: ~8 weeks, anteverted, non-tender, no palpable masses\par      Adnexa: non-palpable, non-tender\par      Perineum: normal, no lesions\par      Hemorrhoids: no external hemorrhoids noted\par      Anorectum: deferred\par Cough Stress Test: NEGATIVE (empty)\par Postvoid residual: 5 mL

## 2022-10-05 ENCOUNTER — TRANSCRIPTION ENCOUNTER (OUTPATIENT)
Age: 32
End: 2022-10-05

## 2022-10-05 LAB
APPEARANCE: ABNORMAL
BILIRUBIN URINE: NEGATIVE
BLOOD URINE: ABNORMAL
COLOR: NORMAL
GLUCOSE QUALITATIVE U: NEGATIVE
KETONES URINE: NEGATIVE
LEUKOCYTE ESTERASE URINE: NEGATIVE
NITRITE URINE: NEGATIVE
PH URINE: 7
PROTEIN URINE: NEGATIVE
SPECIFIC GRAVITY URINE: 1.02
UROBILINOGEN URINE: NORMAL

## 2022-10-06 LAB — BACTERIA UR CULT: NORMAL

## 2022-10-10 ENCOUNTER — APPOINTMENT (OUTPATIENT)
Dept: ULTRASOUND IMAGING | Facility: CLINIC | Age: 32
End: 2022-10-10

## 2022-10-10 PROBLEM — Z80.42 FAMILY HISTORY OF MALIGNANT NEOPLASM OF PROSTATE: Status: ACTIVE | Noted: 2022-10-10

## 2022-10-10 PROBLEM — Z82.49 FAMILY HISTORY OF HYPERTENSION: Status: ACTIVE | Noted: 2022-10-10

## 2022-10-10 PROBLEM — Z80.3 FAMILY HISTORY OF MALIGNANT NEOPLASM OF BREAST: Status: ACTIVE | Noted: 2022-10-10

## 2022-10-10 PROBLEM — Z80.0 FAMILY HISTORY OF COLON CANCER: Status: ACTIVE | Noted: 2022-10-10

## 2022-10-10 PROBLEM — Z78.9 SOCIAL ALCOHOL USE: Status: ACTIVE | Noted: 2022-10-10

## 2022-10-10 PROBLEM — Z78.9 NON-SMOKER: Status: ACTIVE | Noted: 2022-10-10

## 2022-10-10 PROCEDURE — 76830 TRANSVAGINAL US NON-OB: CPT

## 2022-10-13 ENCOUNTER — NON-APPOINTMENT (OUTPATIENT)
Age: 32
End: 2022-10-13

## 2022-10-26 ENCOUNTER — RESULT CHARGE (OUTPATIENT)
Age: 32
End: 2022-10-26

## 2022-10-26 ENCOUNTER — APPOINTMENT (OUTPATIENT)
Dept: UROGYNECOLOGY | Facility: CLINIC | Age: 32
End: 2022-10-26

## 2022-10-26 LAB
BILIRUB UR QL STRIP: NORMAL
CLARITY UR: CLEAR
COLLECTION METHOD: NORMAL
GLUCOSE UR-MCNC: NORMAL
HCG UR QL: 0.2 EU/DL
HGB UR QL STRIP.AUTO: NORMAL
KETONES UR-MCNC: NORMAL
LEUKOCYTE ESTERASE UR QL STRIP: NORMAL
NITRITE UR QL STRIP: NORMAL
PH UR STRIP: 5.5
PROT UR STRIP-MCNC: NORMAL
SP GR UR STRIP: 1.01

## 2022-10-26 PROCEDURE — 51797 INTRAABDOMINAL PRESSURE TEST: CPT | Mod: 26

## 2022-10-26 PROCEDURE — 51784 ANAL/URINARY MUSCLE STUDY: CPT | Mod: 26

## 2022-10-26 PROCEDURE — 51729 CYSTOMETROGRAM W/VP&UP: CPT | Mod: 26

## 2022-10-26 PROCEDURE — 81003 URINALYSIS AUTO W/O SCOPE: CPT | Mod: QW

## 2022-11-02 ENCOUNTER — APPOINTMENT (OUTPATIENT)
Dept: UROGYNECOLOGY | Facility: CLINIC | Age: 32
End: 2022-11-02

## 2022-11-02 VITALS — TEMPERATURE: 97.1 F

## 2022-11-02 DIAGNOSIS — Z87.59 PERSONAL HISTORY OF OTHER COMPLICATIONS OF PREGNANCY, CHILDBIRTH AND THE PUERPERIUM: ICD-10-CM

## 2022-11-02 DIAGNOSIS — R93.5 ABNORMAL FINDINGS ON DIAGNOSTIC IMAGING OF OTHER ABDOMINAL REGIONS, INCLUDING RETROPERITONEUM: ICD-10-CM

## 2022-11-02 PROCEDURE — 99214 OFFICE O/P EST MOD 30 MIN: CPT

## 2022-11-02 NOTE — HISTORY OF PRESENT ILLNESS
[FreeTextEntry1] : Domenica is a 33yo P3 with stage II prolapse (apical predominant) here for follow-up discussion of management. She is interested in exploring surgical management and was undecided about uterine preservation and desired approach. Today, she reports that she would like to have a hysterectomy. We re-confirmed with her that she is no longer interested in childbearing. \par \par Pre-op workup:\par - UDS 10/26/22: senior care 392 mL, no DO; occult LIBRA present with mean ALPP 106 cm H2O, MUCP 40-50 cm H2O.\par - TUVS 10/10/22: thickened endometrial lining with solid and cystic components with vascular flow.\par \par Given recent history of spontaneous  and above ultrasound findings, patient was referred to her OB for further evaluation for possible retained POCs. She hasn't had this follow-up, however since then she has had a normal period (10/26) and denies any abnormal bleeding after.

## 2022-11-02 NOTE — DISCUSSION/SUMMARY
[FreeTextEntry1] : Domenica is a 31yo P3 with stage II POP, apical predominant, as well as occult LIBRA. We had a lengthy discussion about management options again today. Specifically we discussed the differences, risks and benefits of vaginal native tissue repair approach compared to the abdominal (robotic-assisted) mesh-augmented approach. She is interested in the procedure the most potential for a longer lasting effectiveness and would like to proceed with a robotic hysterectomy, bilateral salpingectomy and sacrocolpopexy. \par \par We discussed the permanence of the hysterectomy and the fact that she will no longer be able to bear children. She understands this and affirms that she is done with childbearing. She expressed that she had signed consent for a tubal ligation with her last pregnancy in the event that she needed a  section. We discussed need to repeat TVUS given abnormal finding on last scan and recent menstrual period after her spontaneous . \par \par We also discussed findings on urodynamic testing. We discussed the implications of a positive LIBRA test management alternatives including expectant management after prolapse procedure vs. concurrent management with either bulking or sling. She desires concurrent anti-incontinence procedure and would like a midurethral sling. \par \par At this time will book for robotic-assisted supracervical hysterectomy, bilateral salpingectomy, sacrocolpopexy, midurethral sling and cystoscopy. She will return to the office for pre-op visit once surgical date selected.

## 2022-11-02 NOTE — PHYSICAL EXAM
[FreeTextEntry1] : General: Well, appearing, no acute distress\par HEENT: Normocephalic, atraumatic\par Respiratory: Speaking in full sentences comfortably, normal work of breathing and no cough during visit\par Extremities: No upper extremity edema noted\par Skin: No obvious rash or skin lesions\par Neuro: Alert and oriented x 3, speech is fluent, normal rate\par Psych: Normal mood and affect

## 2022-11-08 ENCOUNTER — APPOINTMENT (OUTPATIENT)
Dept: ULTRASOUND IMAGING | Facility: CLINIC | Age: 32
End: 2022-11-08

## 2022-11-08 PROCEDURE — 76830 TRANSVAGINAL US NON-OB: CPT

## 2022-11-18 ENCOUNTER — TRANSCRIPTION ENCOUNTER (OUTPATIENT)
Age: 32
End: 2022-11-18

## 2022-11-23 ENCOUNTER — APPOINTMENT (OUTPATIENT)
Dept: UROGYNECOLOGY | Facility: CLINIC | Age: 32
End: 2022-11-23

## 2022-11-23 ENCOUNTER — OUTPATIENT (OUTPATIENT)
Dept: OUTPATIENT SERVICES | Facility: HOSPITAL | Age: 32
LOS: 1 days | End: 2022-11-23
Payer: MEDICAID

## 2022-11-23 VITALS
TEMPERATURE: 99 F | HEART RATE: 92 BPM | WEIGHT: 188.05 LBS | DIASTOLIC BLOOD PRESSURE: 81 MMHG | SYSTOLIC BLOOD PRESSURE: 133 MMHG | OXYGEN SATURATION: 97 % | HEIGHT: 66 IN | RESPIRATION RATE: 17 BRPM

## 2022-11-23 DIAGNOSIS — N81.4 UTEROVAGINAL PROLAPSE, UNSPECIFIED: ICD-10-CM

## 2022-11-23 DIAGNOSIS — Z01.818 ENCOUNTER FOR OTHER PREPROCEDURAL EXAMINATION: ICD-10-CM

## 2022-11-23 DIAGNOSIS — Z98.890 OTHER SPECIFIED POSTPROCEDURAL STATES: Chronic | ICD-10-CM

## 2022-11-23 DIAGNOSIS — Z29.9 ENCOUNTER FOR PROPHYLACTIC MEASURES, UNSPECIFIED: ICD-10-CM

## 2022-11-23 DIAGNOSIS — N39.3 STRESS INCONTINENCE (FEMALE) (MALE): ICD-10-CM

## 2022-11-23 DIAGNOSIS — N81.2 INCOMPLETE UTEROVAGINAL PROLAPSE: ICD-10-CM

## 2022-11-23 LAB
ANION GAP SERPL CALC-SCNC: 11 MMOL/L — SIGNIFICANT CHANGE UP (ref 5–17)
BLD GP AB SCN SERPL QL: NEGATIVE — SIGNIFICANT CHANGE UP
BUN SERPL-MCNC: 13 MG/DL — SIGNIFICANT CHANGE UP (ref 7–23)
CALCIUM SERPL-MCNC: 9.2 MG/DL — SIGNIFICANT CHANGE UP (ref 8.4–10.5)
CHLORIDE SERPL-SCNC: 105 MMOL/L — SIGNIFICANT CHANGE UP (ref 96–108)
CO2 SERPL-SCNC: 24 MMOL/L — SIGNIFICANT CHANGE UP (ref 22–31)
CREAT SERPL-MCNC: 1.07 MG/DL — SIGNIFICANT CHANGE UP (ref 0.5–1.3)
EGFR: 71 ML/MIN/1.73M2 — SIGNIFICANT CHANGE UP
GLUCOSE SERPL-MCNC: 97 MG/DL — SIGNIFICANT CHANGE UP (ref 70–99)
HCT VFR BLD CALC: 36.1 % — SIGNIFICANT CHANGE UP (ref 34.5–45)
HGB BLD-MCNC: 11.5 G/DL — SIGNIFICANT CHANGE UP (ref 11.5–15.5)
MCHC RBC-ENTMCNC: 28 PG — SIGNIFICANT CHANGE UP (ref 27–34)
MCHC RBC-ENTMCNC: 31.9 GM/DL — LOW (ref 32–36)
MCV RBC AUTO: 87.8 FL — SIGNIFICANT CHANGE UP (ref 80–100)
NRBC # BLD: 0 /100 WBCS — SIGNIFICANT CHANGE UP (ref 0–0)
PLATELET # BLD AUTO: 331 K/UL — SIGNIFICANT CHANGE UP (ref 150–400)
POTASSIUM SERPL-MCNC: 4.1 MMOL/L — SIGNIFICANT CHANGE UP (ref 3.5–5.3)
POTASSIUM SERPL-SCNC: 4.1 MMOL/L — SIGNIFICANT CHANGE UP (ref 3.5–5.3)
RBC # BLD: 4.11 M/UL — SIGNIFICANT CHANGE UP (ref 3.8–5.2)
RBC # FLD: 12.5 % — SIGNIFICANT CHANGE UP (ref 10.3–14.5)
RH IG SCN BLD-IMP: POSITIVE — SIGNIFICANT CHANGE UP
SODIUM SERPL-SCNC: 140 MMOL/L — SIGNIFICANT CHANGE UP (ref 135–145)
WBC # BLD: 10.22 K/UL — SIGNIFICANT CHANGE UP (ref 3.8–10.5)
WBC # FLD AUTO: 10.22 K/UL — SIGNIFICANT CHANGE UP (ref 3.8–10.5)

## 2022-11-23 PROCEDURE — 86850 RBC ANTIBODY SCREEN: CPT

## 2022-11-23 PROCEDURE — 99215 OFFICE O/P EST HI 40 MIN: CPT

## 2022-11-23 PROCEDURE — 86901 BLOOD TYPING SEROLOGIC RH(D): CPT

## 2022-11-23 PROCEDURE — 86900 BLOOD TYPING SEROLOGIC ABO: CPT

## 2022-11-23 PROCEDURE — 83036 HEMOGLOBIN GLYCOSYLATED A1C: CPT

## 2022-11-23 PROCEDURE — 80048 BASIC METABOLIC PNL TOTAL CA: CPT

## 2022-11-23 PROCEDURE — 85027 COMPLETE CBC AUTOMATED: CPT

## 2022-11-23 PROCEDURE — G0463: CPT

## 2022-11-23 RX ORDER — CHLORHEXIDINE GLUCONATE 213 G/1000ML
1 SOLUTION TOPICAL ONCE
Refills: 0 | Status: DISCONTINUED | OUTPATIENT
Start: 2022-12-08 | End: 2022-12-09

## 2022-11-23 NOTE — H&P PST ADULT - ASSESSMENT
MICHAELI VTE 2.0 SCORE [CLOT updated 2019]    AGE RELATED RISK FACTORS                                                       MOBILITY RELATED FACTORS  [ ] Age 41-60 years                                            (1 Point)                    [ ] Bed rest                                                        (1 Point)  [ ] Age: 61-74 years                                           (2 Points)                  [ ] Plaster cast                                                   (2 Points)  [ ] Age= 75 years                                              (3 Points)                    [ ] Bed bound for more than 72 hours                 (2 Points)    DISEASE RELATED RISK FACTORS                                               GENDER SPECIFIC FACTORS  [ ] Edema in the lower extremities                       (1 Point)              [ ] Pregnancy                                                     (1 Point)  [ ] Varicose veins                                               (1 Point)                     [ ] Post-partum < 6 weeks                                   (1 Point)             [ ] BMI > 25 Kg/m2                                            (1 Point)                     [ ] Hormonal therapy  or oral contraception          (1 Point)                 [ ] Sepsis (in the previous month)                        (1 Point)               [ ] History of pregnancy complications                 (1 point)  [ ] Pneumonia or serious lung disease                                               [ ] Unexplained or recurrent                     (1 Point)           (in the previous month)                               (1 Point)  [ ] Abnormal pulmonary function test                     (1 Point)                 SURGERY RELATED RISK FACTORS  [ ] Acute myocardial infarction                              (1 Point)               [ ]  Section                                             (1 Point)  [ ] Congestive heart failure (in the previous month)  (1 Point)      [ ] Minor surgery                                                  (1 Point)   [ ] Inflammatory bowel disease                             (1 Point)               [ ] Arthroscopic surgery                                        (2 Points)  [ ] Central venous access                                      (2 Points)                [x ] General surgery lasting more than 45 minutes (2 points)  [ ] Malignancy- Present or previous                   (2 Points)                [ ] Elective arthroplasty                                         (5 points)    [ ] Stroke (in the previous month)                          (5 Points)                                                                                                                                                           HEMATOLOGY RELATED FACTORS                                                 TRAUMA RELATED RISK FACTORS  [ ] Prior episodes of VTE                                     (3 Points)                [ ] Fracture of the hip, pelvis, or leg                       (5 Points)  [ ] Positive family history for VTE                         (3 Points)             [ ] Acute spinal cord injury (in the previous month)  (5 Points)  [ ] Prothrombin 99785 A                                     (3 Points)               [ ] Paralysis  (less than 1 month)                             (5 Points)  [ ] Factor V Leiden                                             (3 Points)                  [ ] Multiple Trauma within 1 month                        (5 Points)  [ ] Lupus anticoagulants                                     (3 Points)                                                           [ ] Anticardiolipin antibodies                               (3 Points)                                                       [ ] High homocysteine in the blood                      (3 Points)                                             [ ] Other congenital or acquired thrombophilia      (3 Points)                                                [ ] Heparin induced thrombocytopenia                  (3 Points)                                     Total Score [          ] MICHAELI VTE 2.0 SCORE [CLOT updated 2019]    AGE RELATED RISK FACTORS                                                       MOBILITY RELATED FACTORS  [ ] Age 41-60 years                                            (1 Point)                    [ ] Bed rest                                                        (1 Point)  [ ] Age: 61-74 years                                           (2 Points)                  [ ] Plaster cast                                                   (2 Points)  [ ] Age= 75 years                                              (3 Points)                    [ ] Bed bound for more than 72 hours                 (2 Points)    DISEASE RELATED RISK FACTORS                                               GENDER SPECIFIC FACTORS  [ ] Edema in the lower extremities                       (1 Point)              [ ] Pregnancy                                                     (1 Point)  [ ] Varicose veins                                               (1 Point)                     [ ] Post-partum < 6 weeks                                   (1 Point)             [x ] BMI > 25 Kg/m2                                            (1 Point)                     [ ] Hormonal therapy  or oral contraception          (1 Point)                 [ ] Sepsis (in the previous month)                        (1 Point)               [ ] History of pregnancy complications                 (1 point)  [ ] Pneumonia or serious lung disease                                               [ ] Unexplained or recurrent                     (1 Point)           (in the previous month)                               (1 Point)  [ ] Abnormal pulmonary function test                     (1 Point)                 SURGERY RELATED RISK FACTORS  [ ] Acute myocardial infarction                              (1 Point)               [ ]  Section                                             (1 Point)  [ ] Congestive heart failure (in the previous month)  (1 Point)      [ ] Minor surgery                                                  (1 Point)   [ ] Inflammatory bowel disease                             (1 Point)               [ ] Arthroscopic surgery                                        (2 Points)  [ ] Central venous access                                      (2 Points)                [x ] General surgery lasting more than 45 minutes (2 points)  [ ] Malignancy- Present or previous                   (2 Points)                [ ] Elective arthroplasty                                         (5 points)    [ ] Stroke (in the previous month)                          (5 Points)                                                                                                                                                           HEMATOLOGY RELATED FACTORS                                                 TRAUMA RELATED RISK FACTORS  [ ] Prior episodes of VTE                                     (3 Points)                [ ] Fracture of the hip, pelvis, or leg                       (5 Points)  [ ] Positive family history for VTE                         (3 Points)             [ ] Acute spinal cord injury (in the previous month)  (5 Points)  [ ] Prothrombin 81375 A                                     (3 Points)               [ ] Paralysis  (less than 1 month)                             (5 Points)  [ ] Factor V Leiden                                             (3 Points)                  [ ] Multiple Trauma within 1 month                        (5 Points)  [ ] Lupus anticoagulants                                     (3 Points)                                                           [ ] Anticardiolipin antibodies                               (3 Points)                                                       [ ] High homocysteine in the blood                      (3 Points)                                             [ ] Other congenital or acquired thrombophilia      (3 Points)                                                [ ] Heparin induced thrombocytopenia                  (3 Points)                                     Total Score [   3       ]

## 2022-11-23 NOTE — H&P PST ADULT - FALL HARM RISK - UNIVERSAL INTERVENTIONS
Bed in lowest position, wheels locked, appropriate side rails in place/Call bell, personal items and telephone in reach/Instruct patient to call for assistance before getting out of bed or chair/Non-slip footwear when patient is out of bed/[place] to call system/Physically safe environment - no spills, clutter or unnecessary equipment/Purposeful Proactive Rounding/Room/bathroom lighting operational, light cord in reach

## 2022-11-23 NOTE — H&P PST ADULT - NSICDXPASTMEDICALHX_GEN_ALL_CORE_FT
PAST MEDICAL HISTORY:  Migraines     Miscarriage Sept 2022    Uterovaginal prolapse      PAST MEDICAL HISTORY:  Migraines     Miscarriage Sept 2022    Stress incontinence     Uterovaginal prolapse      PAST MEDICAL HISTORY:  2019 novel coronavirus disease (COVID-19) Dec 2021- mild symptoms only lasted 2 days    H/O blood clots uterine blood clots x 2 during 2 and 3rd pregnancy,  (2014, 2021), was on Baby ASA during pregnancy    Migraines     Miscarriage Sept 2022    Stress incontinence     Uterovaginal prolapse

## 2022-11-23 NOTE — H&P PST ADULT - HISTORY OF PRESENT ILLNESS
33 y/o female with history of uterovaginal prolapse and stress incontinence presents today for presurgical evaluation.      She is scheduled for laparoscopic robotic supracervical hysterectomy, laparoscopic bilateral salpingectomy, sacral colpopexy, midurethral sling, cystoscopy on 12/8/22.    Preop Covid swab scheduled for..... 31 y/o female with history of uterovaginal prolapse and stress incontinence presents today for presurgical evaluation.      She is scheduled for laparoscopic robotic supracervical hysterectomy, laparoscopic bilateral salpingectomy, sacral colpopexy, midurethral sling, cystoscopy on 12/8/22.    Preop Covid swab scheduled for preop Covid swab on 12/5/22 at [facility] site in [place].  31 y/o female with history of uterovaginal prolapse and stress incontinence presents today for presurgical evaluation.  She reports history of pelvic organ prolapse and cervical prolapse, and stress incontinence.  She had a recent miscarriage Sept 2022, but was found to have retained tissue for which her OB/GYN gave her misoprostol twice.  She is scheduled to f/u with gynecology for /S to evaluate for retained tissue.  She denies any recent infections, hematuria, nocturia.  She admits to incontinence with laughing, coughing.  She is scheduled for laparoscopic robotic supracervical hysterectomy, laparoscopic bilateral salpingectomy, sacral colpopexy, midurethral sling, cystoscopy on 12/8/22.    She denies any recent infection, fever, chills, chest pain, shortness of breath, cough, wheezing.      Preop Covid swab scheduled for preop Covid swab on 12/5/22 at [facility] site in [place].  33 y/o female with history of uterovaginal prolapse and stress incontinence presents today for presurgical evaluation.  She reports history of pelvic organ prolapse and stress incontinence.  She had a recent miscarriage Sept 2022, but was found to have retained tissue for which her OB/GYN prescribed misoprostol twice.  She is scheduled to f/u with gynecology for another U/S to evaluate for more retained tissue.  She denies any recent infections, hematuria, nocturia.  She admits to incontinence with laughing, coughing.  She is scheduled for laparoscopic robotic supracervical hysterectomy, laparoscopic bilateral salpingectomy, sacral colpopexy, midurethral sling, cystoscopy on 12/8/22.    She denies any recent infection, fever, chills, chest pain, shortness of breath, cough, wheezing.      Preop Covid swab scheduled for preop Covid swab on 12/5/22 at [facility] site in [place].

## 2022-11-23 NOTE — H&P PST ADULT - NSICDXPASTSURGICALHX_GEN_ALL_CORE_FT
PAST SURGICAL HISTORY:  History of dilatation and curettage x3     PAST SURGICAL HISTORY:  History of dilatation and curettage x3     (normal spontaneous vaginal delivery) x3 (, , )

## 2022-11-23 NOTE — H&P PST ADULT - PROBLEM SELECTOR PLAN 1
Pt. is scheduled for laparoscopic robotic supracervical hysterectomy, laparoscopic robotic bilateral salpingectomy, sacral colpopexy, midurethral sling, cystoscopy on 12/8/22.  Preop instructions reviewed, pt verbalized understanding.  Preop labs drawn (CBC, BMP, HGBA1C, T & S and Urine C/S).................  Preop Covid swab scheduled for....... Pt. is scheduled for laparoscopic robotic supracervical hysterectomy, laparoscopic robotic bilateral salpingectomy, sacral colpopexy, midurethral sling, cystoscopy on 12/8/22.  Preop instructions reviewed, pt verbalized understanding.  Preop labs drawn (CBC, BMP, HGBA1C, T & S).  (Urine C/S was obtained at UroGyn office today.)  Preop Covid swab scheduled for 12/5/22 at [facility] testing site in [place].

## 2022-11-24 LAB
A1C WITH ESTIMATED AVERAGE GLUCOSE RESULT: 5.5 % — SIGNIFICANT CHANGE UP (ref 4–5.6)
ESTIMATED AVERAGE GLUCOSE: 111 MG/DL — SIGNIFICANT CHANGE UP (ref 68–114)

## 2022-11-25 LAB — BACTERIA UR CULT: NORMAL

## 2022-11-25 NOTE — DISCUSSION/SUMMARY
[FreeTextEntry1] : Domenica Couch is a 32 year P3 with stage II uterovaginal prolapse, scheduled for robotic assisted supracervical hysterectomy, bilateral salpingectomy, sacrocolpopexy, midurethral sling, cystoscopy on 12/8/22.\par \par She was counseled on surgical options including vaginal apical suspension (uterosacral ligament suspension, sacrospinous ligament suspension), abdominal approach (sacrocolpopexy). She desires procedure with the lowest rate of recurrence. We discussed risks specific to mesh exposure. She desires abdominal approach with robotic assistance. We reviewed the plan for supracervical laparoscopic hysterectomy, sacrocolpopexy with Y-shaped mesh, bilateral salpingectomy, procedures as indicated with ovarian conservation. We discussed the risk reduction of serous epithelial ovarian cancer with salpingectomy. The patient desires to proceed with salpingectomy at the time of hysterectomy. We discussed permanency of the hysterectomy and implications for future fertility. She understands that this is irreversible and she will no longer be able to carry a pregnancy in the future. We reviewed again the results of the urodynamic test and as previously discussed she requests concurrent treatment of LIBRA with midurethral sling procedure. \par \par Risks of procedure including but not limited to the following were discussed: Bleeding, hemorrhage, need for transfusion, infection, damage to surrounding organs (bladder, bowel, nerves, vessels), ureteral injury, pain, scar tissue formation, DVT/PE. We discussed the risk of recurrence of prolapse, urinary retention, need for discharge home with catheter.\par \par All questions were answered. She expressed understanding of the above and would like to proceed with the scheduled surgery. I provided pre-operative and post-operative instructions and counseling on expectations.\par \par I spent more than half of this 45-minute visit counseling the patient regarding the risks and benefits of this planned procedure.

## 2022-11-25 NOTE — PHYSICAL EXAM
[Normal] : was normal [Normal Appearance] : general appearance was normal [Pink Rugae] : pink rugae [Moderate] : there was moderate vaginal bleeding [Aa ____] : Aa [unfilled] [Ba ____] : Ba [unfilled] [C ____] : C [unfilled] [GH ____] : GH [unfilled] [PB ____] : PB [unfilled] [TVL ____] : TVL  [unfilled] [Ap ____] : Ap [unfilled] [Bp ____] : Bp [unfilled] [D ____] : D [unfilled] [FreeTextEntry1] : General: Well, appearing, no acute distress\par HEENT: Normocephalic, atraumatic\par Respiratory: Speaking in full sentences comfortably, normal work of breathing and no cough during visit\par Extremities: No upper extremity edema noted\par Skin: No obvious rash or skin lesions\par Neuro: Alert and oriented x 3, speech is fluent, normal rate\par Psych: Normal mood and affect [FreeTextEntry4] : P

## 2022-11-25 NOTE — HISTORY OF PRESENT ILLNESS
[FreeTextEntry1] : 31yo P3 with stage II POP desiring surgical intervention here for pre-op visit. She had a UDS that demonstrated occult LIBRA. She also had TVUS that was notable for possible retained POCs from her recent spontaneous  2 months ago. She s/p follow-up with GYN and had a negative urine pregnancy test and pending beta hCG results. Given US findings, she was given a dose of misoprostol and reports passage of tissue. She is scheduled for additional follow-up in one week to repeat US.\par \par Pre-op work-up:\par - Last pap smear: 2022 WNL per patient, no prior abnormal pap or HPV\par - TVUS: 9.0 cm uterus, cystic material in cavity (recent spontaneous , s/p GYN follow-up, see above note). Upreg negative.\par - UDS: No DO, +LIBRA at 106 cm H2O leak point pressure, CHCF 392 ml, PVR 0 ml

## 2022-11-29 ENCOUNTER — TRANSCRIPTION ENCOUNTER (OUTPATIENT)
Age: 32
End: 2022-11-29

## 2022-12-05 ENCOUNTER — LABORATORY RESULT (OUTPATIENT)
Age: 32
End: 2022-12-05

## 2022-12-07 ENCOUNTER — TRANSCRIPTION ENCOUNTER (OUTPATIENT)
Age: 32
End: 2022-12-07

## 2022-12-08 ENCOUNTER — TRANSCRIPTION ENCOUNTER (OUTPATIENT)
Age: 32
End: 2022-12-08

## 2022-12-08 ENCOUNTER — INPATIENT (INPATIENT)
Facility: HOSPITAL | Age: 32
LOS: 0 days | Discharge: HOME | DRG: 743 | End: 2022-12-09
Attending: OBSTETRICS & GYNECOLOGY | Admitting: OBSTETRICS & GYNECOLOGY
Payer: MEDICAID

## 2022-12-08 ENCOUNTER — APPOINTMENT (OUTPATIENT)
Dept: UROGYNECOLOGY | Facility: HOSPITAL | Age: 32
End: 2022-12-08

## 2022-12-08 VITALS
DIASTOLIC BLOOD PRESSURE: 87 MMHG | HEIGHT: 66 IN | TEMPERATURE: 98 F | RESPIRATION RATE: 18 BRPM | HEART RATE: 77 BPM | SYSTOLIC BLOOD PRESSURE: 147 MMHG | WEIGHT: 188.05 LBS | OXYGEN SATURATION: 98 %

## 2022-12-08 DIAGNOSIS — N81.2 INCOMPLETE UTEROVAGINAL PROLAPSE: ICD-10-CM

## 2022-12-08 DIAGNOSIS — N39.3 STRESS INCONTINENCE (FEMALE) (MALE): ICD-10-CM

## 2022-12-08 DIAGNOSIS — Z98.890 OTHER SPECIFIED POSTPROCEDURAL STATES: Chronic | ICD-10-CM

## 2022-12-08 LAB
BLD GP AB SCN SERPL QL: NEGATIVE — SIGNIFICANT CHANGE UP
GLUCOSE BLDC GLUCOMTR-MCNC: 90 MG/DL — SIGNIFICANT CHANGE UP (ref 70–99)
HCG UR QL: NEGATIVE — SIGNIFICANT CHANGE UP
RH IG SCN BLD-IMP: POSITIVE — SIGNIFICANT CHANGE UP

## 2022-12-08 PROCEDURE — S2900 ROBOTIC SURGICAL SYSTEM: CPT

## 2022-12-08 PROCEDURE — 88305 TISSUE EXAM BY PATHOLOGIST: CPT | Mod: 26

## 2022-12-08 PROCEDURE — 57425 LAPAROSCOPY SURG COLPOPEXY: CPT

## 2022-12-08 PROCEDURE — 58542 LSH W/T/O UT 250 G OR LESS: CPT

## 2022-12-08 PROCEDURE — 57288 REPAIR BLADDER DEFECT: CPT

## 2022-12-08 DEVICE — MESH UPSYLON Y
Type: IMPLANTABLE DEVICE | Status: NON-FUNCTIONAL
Removed: 2022-12-08

## 2022-12-08 DEVICE — ALTIS SINGLE INCISION SLING SYSTEM 7.75CM
Type: IMPLANTABLE DEVICE | Status: NON-FUNCTIONAL
Removed: 2022-12-08

## 2022-12-08 DEVICE — SURGICEL POWDER 3 GRAMS
Type: IMPLANTABLE DEVICE | Status: NON-FUNCTIONAL
Removed: 2022-12-08

## 2022-12-08 RX ORDER — ACETAMINOPHEN 500 MG
1000 TABLET ORAL ONCE
Refills: 0 | Status: COMPLETED | OUTPATIENT
Start: 2022-12-08 | End: 2022-12-08

## 2022-12-08 RX ORDER — SODIUM CHLORIDE 9 MG/ML
1000 INJECTION, SOLUTION INTRAVENOUS
Refills: 0 | Status: DISCONTINUED | OUTPATIENT
Start: 2022-12-08 | End: 2022-12-09

## 2022-12-08 RX ORDER — ONDANSETRON 8 MG/1
4 TABLET, FILM COATED ORAL ONCE
Refills: 0 | Status: DISCONTINUED | OUTPATIENT
Start: 2022-12-08 | End: 2022-12-08

## 2022-12-08 RX ORDER — OXYCODONE HYDROCHLORIDE 5 MG/1
1 TABLET ORAL
Qty: 8 | Refills: 0
Start: 2022-12-08

## 2022-12-08 RX ORDER — HYDROMORPHONE HYDROCHLORIDE 2 MG/ML
0.5 INJECTION INTRAMUSCULAR; INTRAVENOUS; SUBCUTANEOUS
Refills: 0 | Status: DISCONTINUED | OUTPATIENT
Start: 2022-12-08 | End: 2022-12-08

## 2022-12-08 RX ORDER — SODIUM CHLORIDE 9 MG/ML
3 INJECTION INTRAMUSCULAR; INTRAVENOUS; SUBCUTANEOUS EVERY 8 HOURS
Refills: 0 | Status: DISCONTINUED | OUTPATIENT
Start: 2022-12-08 | End: 2022-12-08

## 2022-12-08 RX ORDER — CELECOXIB 200 MG/1
400 CAPSULE ORAL ONCE
Refills: 0 | Status: COMPLETED | OUTPATIENT
Start: 2022-12-08 | End: 2022-12-08

## 2022-12-08 RX ORDER — SIMETHICONE 80 MG/1
80 TABLET, CHEWABLE ORAL EVERY 6 HOURS
Refills: 0 | Status: DISCONTINUED | OUTPATIENT
Start: 2022-12-08 | End: 2022-12-09

## 2022-12-08 RX ORDER — IBUPROFEN 200 MG
600 TABLET ORAL EVERY 6 HOURS
Refills: 0 | Status: DISCONTINUED | OUTPATIENT
Start: 2022-12-08 | End: 2022-12-09

## 2022-12-08 RX ORDER — GABAPENTIN 400 MG/1
600 CAPSULE ORAL ONCE
Refills: 0 | Status: COMPLETED | OUTPATIENT
Start: 2022-12-08 | End: 2022-12-08

## 2022-12-08 RX ORDER — ACETAMINOPHEN 500 MG
975 TABLET ORAL EVERY 6 HOURS
Refills: 0 | Status: DISCONTINUED | OUTPATIENT
Start: 2022-12-08 | End: 2022-12-09

## 2022-12-08 RX ORDER — LIDOCAINE HCL 20 MG/ML
0.2 VIAL (ML) INJECTION ONCE
Refills: 0 | Status: DISCONTINUED | OUTPATIENT
Start: 2022-12-08 | End: 2022-12-08

## 2022-12-08 RX ORDER — CEFAZOLIN SODIUM 1 G
2000 VIAL (EA) INJECTION ONCE
Refills: 0 | Status: COMPLETED | OUTPATIENT
Start: 2022-12-08 | End: 2022-12-08

## 2022-12-08 RX ORDER — KETOROLAC TROMETHAMINE 30 MG/ML
15 SYRINGE (ML) INJECTION EVERY 6 HOURS
Refills: 0 | Status: DISCONTINUED | OUTPATIENT
Start: 2022-12-08 | End: 2022-12-08

## 2022-12-08 RX ADMIN — HYDROMORPHONE HYDROCHLORIDE 0.5 MILLIGRAM(S): 2 INJECTION INTRAMUSCULAR; INTRAVENOUS; SUBCUTANEOUS at 21:15

## 2022-12-08 RX ADMIN — HYDROMORPHONE HYDROCHLORIDE 0.5 MILLIGRAM(S): 2 INJECTION INTRAMUSCULAR; INTRAVENOUS; SUBCUTANEOUS at 19:00

## 2022-12-08 RX ADMIN — Medication 15 MILLIGRAM(S): at 23:20

## 2022-12-08 RX ADMIN — SIMETHICONE 80 MILLIGRAM(S): 80 TABLET, CHEWABLE ORAL at 22:50

## 2022-12-08 RX ADMIN — Medication 1000 MILLIGRAM(S): at 11:12

## 2022-12-08 RX ADMIN — Medication 15 MILLIGRAM(S): at 22:50

## 2022-12-08 RX ADMIN — HYDROMORPHONE HYDROCHLORIDE 0.5 MILLIGRAM(S): 2 INJECTION INTRAMUSCULAR; INTRAVENOUS; SUBCUTANEOUS at 19:42

## 2022-12-08 RX ADMIN — GABAPENTIN 600 MILLIGRAM(S): 400 CAPSULE ORAL at 11:13

## 2022-12-08 RX ADMIN — HYDROMORPHONE HYDROCHLORIDE 0.5 MILLIGRAM(S): 2 INJECTION INTRAMUSCULAR; INTRAVENOUS; SUBCUTANEOUS at 19:27

## 2022-12-08 RX ADMIN — SODIUM CHLORIDE 75 MILLILITER(S): 9 INJECTION, SOLUTION INTRAVENOUS at 21:20

## 2022-12-08 RX ADMIN — CELECOXIB 400 MILLIGRAM(S): 200 CAPSULE ORAL at 11:13

## 2022-12-08 RX ADMIN — HYDROMORPHONE HYDROCHLORIDE 0.5 MILLIGRAM(S): 2 INJECTION INTRAMUSCULAR; INTRAVENOUS; SUBCUTANEOUS at 18:45

## 2022-12-08 RX ADMIN — HYDROMORPHONE HYDROCHLORIDE 0.5 MILLIGRAM(S): 2 INJECTION INTRAMUSCULAR; INTRAVENOUS; SUBCUTANEOUS at 20:57

## 2022-12-08 NOTE — BRIEF OPERATIVE NOTE - OPERATION/FINDINGS
Incomplete uterovaginal prolapse. Uterus approximately 10cm in largest dimension. Grossly normal appearing bilateral fallopian tubes and ovaries. On cystoscopy, normal bladder mucosa and no suture, mesh, injury,  or foreign body noted.  Bilateral ureteral orifices were identified and effluxing clear yellow urine.

## 2022-12-08 NOTE — BRIEF OPERATIVE NOTE - NSICDXBRIEFPROCEDURE_GEN_ALL_CORE_FT
PROCEDURES:  Robot-assisted supracervical hysterectomy with sacrocolpopexy 09-Dec-2022 10:14:39  Chantal Couch  Robot-assisted bilateral salpingectomy 09-Dec-2022 10:15:33  Chantal Couch  Creation, midurethral sling, with cystoscopy 09-Dec-2022 10:15:49  Chantal Couch

## 2022-12-08 NOTE — ASU DISCHARGE PLAN (ADULT/PEDIATRIC) - PROCEDURE
Robotic assisted hysterectomy, bilateral salpingectomy, sacrocolpopexy, mid-urethral sling, and cystoscopy.

## 2022-12-08 NOTE — PROVIDER CONTACT NOTE (OTHER) - ASSESSMENT
pt states bl shoulder pain is 7/10. Also reports lower abdominal & vaginal pain 7/10. Lungs CTA bl although breaths very shallow. Orthos are negative but SaO2 dropped to 76% on RA when standing. Repeat SaO2 when back in bed 95%. Pt denied dizziness or SOB throughout. Incentive spirometer max 250.

## 2022-12-08 NOTE — BRIEF OPERATIVE NOTE - NSICDXBRIEFPREOP_GEN_ALL_CORE_FT
PRE-OP DIAGNOSIS:  Incomplete uterovaginal prolapse 09-Dec-2022 10:16:16  Chantal Couch  Urinary, incontinence, stress female 09-Dec-2022 10:16:03  Chantal Couch

## 2022-12-08 NOTE — ASU DISCHARGE PLAN (ADULT/PEDIATRIC) - NS MD DC FALL RISK RISK
For information on Fall & Injury Prevention, visit: [email or web address] OR  [email or web address] OR  https://www.cdc.gov/steadi/patient.html

## 2022-12-08 NOTE — PATIENT PROFILE ADULT - DEAF OR HARD OF HEARING?
Ship to [facility] [id number]         /cs 1    Underpad, reusable 36x36  [id number]  2 no

## 2022-12-08 NOTE — PATIENT PROFILE ADULT - FALL HARM RISK - UNIVERSAL INTERVENTIONS
Bed in lowest position, wheels locked, appropriate side rails in place/Call bell, personal items and telephone in reach/Instruct patient to call for assistance before getting out of bed or chair/Non-slip footwear when patient is out of bed/[place] to call system/Physically safe environment - no spills, clutter or unnecessary equipment/Purposeful Proactive Rounding/Room/bathroom lighting operational, light cord in reach

## 2022-12-08 NOTE — CHART NOTE - NSCHARTNOTEFT_GEN_A_CORE
Patient seen and examined at bedside, recently post-op.    Patient sleeping cmfortably, easily arousable. No acute concerns at this time.    Christian catheter in place. Has not yet ambulated.   Patient tolerating PO/tolerating clears.  Denies CP, SOB, N/V, fevers, and chills.    Vital Signs Last 24 Hours  T(C): 37.6 (12-08-22 @ 20:45), Max: 37.6 (12-08-22 @ 20:45)  HR: 84 (12-08-22 @ 21:15) (77 - 97)  BP: 139/75 (12-08-22 @ 20:45) (110/88 - 147/87)  RR: 16 (12-08-22 @ 21:15) (14 - 20)  SpO2: 98% (12-08-22 @ 21:15) (94% - 100%)    I&O's Summary    08 Dec 2022 07:01  -  08 Dec 2022 21:29  --------------------------------------------------------  IN: 120 mL / OUT: 515 mL / NET: -395 mL      Physical Exam:  Gen: Comfortable, NAD  Psych: appropriate mood & affect  CV: RRR   Pulm: CTAB  Abd: Soft, appropriately tender, non-distended, +BS  Incision: port sites x5 clean, dry, intact  : minimal spotting on pad  Ext: Warm & well perfused. No edema or tenderness bilaterally      MEDICATIONS  (STANDING):  acetaminophen     Tablet .. 975 milliGRAM(s) Oral every 6 hours  chlorhexidine 2% Cloths 1 Application(s) Topical once  ibuprofen  Tablet. 600 milliGRAM(s) Oral every 6 hours  ketorolac   Injectable 15 milliGRAM(s) IV Push every 6 hours  lactated ringers. 1000 milliLiter(s) (75 mL/Hr) IV Continuous <Continuous>    MEDICATIONS  (PRN):  HYDROmorphone  Injectable 0.5 milliGRAM(s) IV Push every 10 minutes PRN Moderate Pain (4 - 6)  ondansetron Injectable 4 milliGRAM(s) IV Push once PRN Nausea and/or Vomiting  simethicone 80 milliGRAM(s) Chew every 6 hours PRN Gas      A/P: POD#0 s/p  RA-GAURI, BS, SCP, MUS, Cysto. Patient doing well post-op.   - Admit to GYN for inpatient post op management  - Toradol --> Motrin, Tylenol, oxy pRN for pain  - Saturating well on RA  - Hemdynamically stable  - Regular diet, Zofran PRN  - Christian in place, TOV in AM  - AM CBC    D/W Dr Khoa Velazquez-Gaviria PGY2

## 2022-12-08 NOTE — BRIEF OPERATIVE NOTE - NSICDXBRIEFPOSTOP_GEN_ALL_CORE_FT
POST-OP DIAGNOSIS:  Urinary, incontinence, stress female 09-Dec-2022 10:16:25  Chantal Couch  Incomplete uterovaginal prolapse 09-Dec-2022 10:16:22  Chantal Couch

## 2022-12-08 NOTE — ASU DISCHARGE PLAN (ADULT/PEDIATRIC) - CARE PROVIDER_API CALL
Lucila Andrews)  Obstetrics and Gynecology  [address] [place] [address]  Phone: [phone]  Fax: [phone]  Follow Up Time: 2 weeks

## 2022-12-09 ENCOUNTER — TRANSCRIPTION ENCOUNTER (OUTPATIENT)
Age: 32
End: 2022-12-09

## 2022-12-09 VITALS
DIASTOLIC BLOOD PRESSURE: 85 MMHG | OXYGEN SATURATION: 96 % | RESPIRATION RATE: 18 BRPM | TEMPERATURE: 99 F | SYSTOLIC BLOOD PRESSURE: 134 MMHG | HEART RATE: 86 BPM

## 2022-12-09 LAB
HCT VFR BLD CALC: 33 % — LOW (ref 34.5–45)
HGB BLD-MCNC: 10.4 G/DL — LOW (ref 11.5–15.5)
MCHC RBC-ENTMCNC: 28 PG — SIGNIFICANT CHANGE UP (ref 27–34)
MCHC RBC-ENTMCNC: 31.5 GM/DL — LOW (ref 32–36)
MCV RBC AUTO: 88.9 FL — SIGNIFICANT CHANGE UP (ref 80–100)
NRBC # BLD: 0 /100 WBCS — SIGNIFICANT CHANGE UP (ref 0–0)
PLATELET # BLD AUTO: 289 K/UL — SIGNIFICANT CHANGE UP (ref 150–400)
RBC # BLD: 3.71 M/UL — LOW (ref 3.8–5.2)
RBC # FLD: 12.9 % — SIGNIFICANT CHANGE UP (ref 10.3–14.5)
WBC # BLD: 13.8 K/UL — HIGH (ref 3.8–10.5)
WBC # FLD AUTO: 13.8 K/UL — HIGH (ref 3.8–10.5)

## 2022-12-09 PROCEDURE — 86850 RBC ANTIBODY SCREEN: CPT

## 2022-12-09 PROCEDURE — 86900 BLOOD TYPING SEROLOGIC ABO: CPT

## 2022-12-09 PROCEDURE — C1889: CPT

## 2022-12-09 PROCEDURE — 85027 COMPLETE CBC AUTOMATED: CPT

## 2022-12-09 PROCEDURE — 86901 BLOOD TYPING SEROLOGIC RH(D): CPT

## 2022-12-09 PROCEDURE — C1771: CPT

## 2022-12-09 PROCEDURE — 88305 TISSUE EXAM BY PATHOLOGIST: CPT

## 2022-12-09 PROCEDURE — S2900: CPT

## 2022-12-09 PROCEDURE — 82962 GLUCOSE BLOOD TEST: CPT

## 2022-12-09 PROCEDURE — C1781: CPT

## 2022-12-09 PROCEDURE — 93005 ELECTROCARDIOGRAM TRACING: CPT

## 2022-12-09 PROCEDURE — C9399: CPT

## 2022-12-09 PROCEDURE — 81025 URINE PREGNANCY TEST: CPT

## 2022-12-09 PROCEDURE — 93010 ELECTROCARDIOGRAM REPORT: CPT

## 2022-12-09 RX ORDER — OXYCODONE HYDROCHLORIDE 5 MG/1
1 TABLET ORAL
Qty: 5 | Refills: 0
Start: 2022-12-09 | End: 2022-12-10

## 2022-12-09 RX ORDER — OXYCODONE HYDROCHLORIDE 5 MG/1
1 TABLET ORAL
Qty: 4 | Refills: 0
Start: 2022-12-09

## 2022-12-09 RX ORDER — OXYCODONE HYDROCHLORIDE 5 MG/1
1 TABLET ORAL
Qty: 8 | Refills: 0
Start: 2022-12-09

## 2022-12-09 RX ADMIN — Medication 975 MILLIGRAM(S): at 00:51

## 2022-12-09 RX ADMIN — Medication 600 MILLIGRAM(S): at 11:33

## 2022-12-09 RX ADMIN — Medication 975 MILLIGRAM(S): at 14:55

## 2022-12-09 RX ADMIN — Medication 600 MILLIGRAM(S): at 12:03

## 2022-12-09 RX ADMIN — Medication 975 MILLIGRAM(S): at 09:12

## 2022-12-09 RX ADMIN — Medication 600 MILLIGRAM(S): at 06:32

## 2022-12-09 RX ADMIN — SIMETHICONE 80 MILLIGRAM(S): 80 TABLET, CHEWABLE ORAL at 06:02

## 2022-12-09 RX ADMIN — SIMETHICONE 80 MILLIGRAM(S): 80 TABLET, CHEWABLE ORAL at 11:33

## 2022-12-09 RX ADMIN — Medication 975 MILLIGRAM(S): at 09:42

## 2022-12-09 RX ADMIN — Medication 975 MILLIGRAM(S): at 01:21

## 2022-12-09 RX ADMIN — Medication 600 MILLIGRAM(S): at 06:02

## 2022-12-09 NOTE — PROGRESS NOTE ADULT - SUBJECTIVE AND OBJECTIVE BOX
POD#1   HD#2  Patient seen and examined at bedside.  Transient desaturation with quick return to baseline overnight. Denies sensation of SOB or dyspnea. Pain appropriately controlled with pain medication. Pt with  well controlled.  Patient is ambulating and tolerating.....   Has not yet passed flatus vs. Patient is passing flatus.    Patient is voiding spontaneously vs. Varela is still in place.   Denies CP, SOB, N/V, fevers, and chills.    Vital Signs Last 24 Hours  T(C): 36.9 (12-09-22 @ 05:16), Max: 37.6 (12-08-22 @ 20:45)  HR: 85 (12-09-22 @ 05:16) (76 - 97)  BP: 130/76 (12-09-22 @ 05:16) (110/88 - 147/87)  RR: 18 (12-09-22 @ 05:16) (14 - 20)  SpO2: 98% (12-09-22 @ 06:10) (76% - 100%)    I&O's Summary    08 Dec 2022 07:01  -  09 Dec 2022 06:43  --------------------------------------------------------  IN: 1137 mL / OUT: 1875 mL / NET: -738 mL        Physical Exam:  General: NAD  CV: RR, S1, S2, no M/R/G  Lungs: CTA b/l, good air flow b/l   Abdomen: Soft, appropriately tender, normoactive bowel sounds  Incision: _ CDI  : no bleeding on pad; LR@...  Ext: No pain or swelling     Labs:      MEDICATIONS  (STANDING):  acetaminophen     Tablet .. 975 milliGRAM(s) Oral every 6 hours  chlorhexidine 2% Cloths 1 Application(s) Topical once  ibuprofen  Tablet. 600 milliGRAM(s) Oral every 6 hours  lactated ringers. 1000 milliLiter(s) (75 mL/Hr) IV Continuous <Continuous>    MEDICATIONS  (PRN):  simethicone 80 milliGRAM(s) Chew every 6 hours PRN Gas      A/P: 32y POD#   s/p .  Patient is stable and doing well.      Neuro: PO pain meds   CV: Hemodynamically stable  Pulm: Saturating well on room air, encourage oob/amb  GI: Advance to regular diet vs. Continue regular diet  : UOP adequate, d/c varela  vs. voiding spontaneously   Heme: c/w HSQ and SCDs for DVT ppx  ID: Afebrile  Endo: No active issues   Dispo: Continue routine post-op care    Radha Villar, PGY1   POD#1   HD#2  Patient seen and examined at bedside.  Transient desaturation with quick return to baseline overnight. Denies sensation of SOB or dyspnea. Pain appropriately controlled with pain medication. Pt with bothersome referred pain to R shoulder and has the sensation of chest pressure. Denies palpitations or chest pain. No known history of cardiac disease.   Patient is ambulating and tolerating a regular diet. Has not yet passed flatus. Christian is still in place.   Denies N/V, fevers, and chills.    Vital Signs Last 24 Hours  T(C): 36.9 (12-09-22 @ 05:16), Max: 37.6 (12-08-22 @ 20:45)  HR: 85 (12-09-22 @ 05:16) (76 - 97)  BP: 130/76 (12-09-22 @ 05:16) (110/88 - 147/87)  RR: 18 (12-09-22 @ 05:16) (14 - 20)  SpO2: 98% (12-09-22 @ 06:10) (76% - 100%)    I&O's Summary    08 Dec 2022 07:01  -  09 Dec 2022 06:43  --------------------------------------------------------  IN: 1137 mL / OUT: 1875 mL / NET: -738 mL      Physical Exam:  General: NAD  CV: RR - EKG: , +2 peripheral pulses.   Lungs: Non labored breathing. Able to lie flat. No additional desaturations overnight.   Abdomen: Mildly distended and tympanic. Appropriately tender   Incision: Robotic port sites CDI  : Scant bleeding on pad; LR@75  Ext: No pain or swelling     Labs:      MEDICATIONS  (STANDING):  acetaminophen     Tablet .. 975 milliGRAM(s) Oral every 6 hours  chlorhexidine 2% Cloths 1 Application(s) Topical once  ibuprofen  Tablet. 600 milliGRAM(s) Oral every 6 hours  lactated ringers. 1000 milliLiter(s) (75 mL/Hr) IV Continuous <Continuous>    MEDICATIONS  (PRN):  simethicone 80 milliGRAM(s) Chew every 6 hours PRN Gas         POD#1   HD#2  Patient seen and examined at bedside.  Transient desaturation with quick return to baseline overnight. Denies sensation of SOB or dyspnea. Pain appropriately controlled with pain medication. Pt with bothersome referred pain to R shoulder and has the sensation of chest pressure. Denies palpitations or chest pain. No known history of cardiac disease.   Patient is ambulating and tolerating a regular diet. Has not yet passed flatus. Christian is still in place.   Denies N/V, fevers, and chills.    Vital Signs Last 24 Hours  T(C): 36.9 (12-09-22 @ 05:16), Max: 37.6 (12-08-22 @ 20:45)  HR: 85 (12-09-22 @ 05:16) (76 - 97)  BP: 130/76 (12-09-22 @ 05:16) (110/88 - 147/87)  RR: 18 (12-09-22 @ 05:16) (14 - 20)  SpO2: 98% (12-09-22 @ 06:10) (76% - 100%)    I&O's Summary    08 Dec 2022 07:01  -  09 Dec 2022 06:43  --------------------------------------------------------  IN: 1137 mL / OUT: 1875 mL / NET: -738 mL      Physical Exam:  General: NAD  CV: RR - EKG demonstrated NSR w/ HR of 96 , +2 peripheral pulses.   Lungs: Non labored breathing. Able to lie flat. No additional desaturations overnight.   Abdomen: Mildly distended and tympanic. Appropriately tender   Incision: Robotic port sites CDI  : Scant bleeding on pad; LR@75  Ext: No pain or swelling     Labs:      MEDICATIONS  (STANDING):  acetaminophen     Tablet .. 975 milliGRAM(s) Oral every 6 hours  chlorhexidine 2% Cloths 1 Application(s) Topical once  ibuprofen  Tablet. 600 milliGRAM(s) Oral every 6 hours  lactated ringers. 1000 milliLiter(s) (75 mL/Hr) IV Continuous <Continuous>    MEDICATIONS  (PRN):  simethicone 80 milliGRAM(s) Chew every 6 hours PRN Gas

## 2022-12-09 NOTE — DISCHARGE NOTE PROVIDER - ATTENDING DISCHARGE PHYSICAL EXAMINATION:
Patient seen and examined this morning, assessment as per progress note:  AVSS, EKG WNL  Chest: normal respiratory efforts  Abdomen: soft, non-distended, +bowel sounds, appropriately tender; incision were clear/dry/intact  : no excess bleeding  Extremities: well-perfused

## 2022-12-09 NOTE — DISCHARGE NOTE PROVIDER - NSDCCPTREATMENT_GEN_ALL_CORE_FT
PRINCIPAL PROCEDURE  Procedure: Robot-assisted total hysterectomy with sacrocolpopexy  Findings and Treatment: with MUS and cystoscopy

## 2022-12-09 NOTE — DISCHARGE NOTE PROVIDER - NSDCFUSCHEDAPPT_GEN_ALL_CORE_FT
[facility] Physician [facility]  UROGYN [address]  Scheduled Appointment: 12/21/2022

## 2022-12-09 NOTE — DISCHARGE NOTE NURSING/CASE MANAGEMENT/SOCIAL WORK - PATIENT PORTAL LINK FT
You can access the FollowMyHealth Patient Portal offered by [facility] by registering at the following website: [email or web address]. By joining [employer]’s FollowMyHealth portal, you will also be able to view your health information using other applications (apps) compatible with our system.

## 2022-12-09 NOTE — DISCHARGE NOTE NURSING/CASE MANAGEMENT/SOCIAL WORK - NSDCPNINST_GEN_ALL_CORE
Follow up appointment per MD.  Notify for fever, difficulty urinating, leg or chest pain, drainage or bleeding from incision sites and any other questions or concerns.

## 2022-12-09 NOTE — DISCHARGE NOTE PROVIDER - CARE PROVIDER_API CALL
Lucila Andrews)  Obstetrics and Gynecology  [address] [place] [address]  Phone: [phone]  Fax: [phone]  Follow Up Time: 2 weeks

## 2022-12-09 NOTE — DISCHARGE NOTE NURSING/CASE MANAGEMENT/SOCIAL WORK - NSDCPEFALRISK_GEN_ALL_CORE
For information on Fall & Injury Prevention, visit: [email or web address] OR  [email or web address] OR  https://www.cdc.gov/steadi/patient.html

## 2022-12-09 NOTE — DISCHARGE NOTE PROVIDER - NSDCMRMEDTOKEN_GEN_ALL_CORE_FT
Excedrin oral tablet: 2 tab(s) orally every 6 hours, As Needed. Instructed to stop on 12/1/22.   oxyCODONE 5 mg oral tablet: 1 tab(s) orally every 8 hours MDD:3 tabs

## 2022-12-09 NOTE — PROGRESS NOTE ADULT - ASSESSMENT
A/P: 32y POD#1  s/p RA TLH, BS, sacrocolpopexy MUS, and cystoscopy.  Patient is stable and doing well for post operative course.      Neuro: PO pain meds   CV: Hemodynamically stable. EKG:  Monitor for resolution of chest pressure, likely secondary to CO2 gas insufflation. F/U AM CBC   Pulm: Saturating well on room air, will discontinue continuos pulse oxygen. Encourage oob/amb  GI: Continue regular diet  : TOV this morning. .   Heme: c/w SCDs for DVT ppx  ID: Afebrile  Endo: No active issues   Dispo: Continue routine post-op care    Karime Musa, PGY-2  A/P: 32y POD#1  s/p RA TLH, BS, sacrocolpopexy MUS, and cystoscopy.  Patient is stable and doing well for post operative course.      Neuro: PO pain meds   CV: Hemodynamically stable. EKG demonstrates NSR.  Monitor for resolution of chest pressure, likely secondary to CO2 gas insufflation. F/U AM CBC   Pulm: Saturating well on room air, will discontinue continuos pulse oxygen. Encourage oob/amb  GI: Continue regular diet  : TOV this morning. .   Heme: c/w SCDs for DVT ppx  ID: Afebrile  Endo: No active issues   Dispo: Continue routine post-op care    Karime Musa, PGY-2  A/P: 32y POD#1  s/p RA TLH, BS, sacrocolpopexy MUS, and cystoscopy.  Patient is stable and doing well for post operative course.      Neuro: PO pain meds   CV: Hemodynamically stable. EKG demonstrates NSR.  Monitor for resolution of chest pressure, likely secondary to CO2 gas insufflation. F/U AM CBC   Pulm: Saturating well on room air, will discontinue continuos pulse oxygen. Encourage oob/amb  GI: Continue regular diet  : TOV this morning. .   Heme: c/w SCDs for DVT ppx  ID: Afebrile  Endo: No active issues   Dispo: Continue routine post-op care    Karime Musa, PGY-2   -----------------------------  Urogynecology Fellow Note     Patient reports that she is doing well. Did have episode of desaturation overnight, continuous pulse ox normal throughout night.  Reports chest pressure and shoulder discomfort this morning. She denies nausea, vomiting, fevers, chills. Reports that she tolerated small amount of food last night.     VSS. UOP adequate. AM labs pending   Preop Hct 36.1 , AM labs pending     Exam:   Gen: NAD, resting comfortably in bed  CVS: RRR  Lung: no use of accessory muscles  Abdomen: Soft, nontender, nondistended. +BS. Incision sites covered with dermabond  : Christian catheter draining clear yellow urine; minimal staining on pad   Ext: SCDs on and functioning     Assessment:   Domenica is POD1 s/p RA-GAURI, SCP, BS, MUS, cystoscopy doing well, reporting chest pressure and shoulder pain this morning, EKG WNL, likely secondary to gas discomfort from insufflation.     Plan:   -AM labs pending   -Will continue to monitor symptoms, encouraged her to ambulate to mobilize gas   -TOV today  -Continue diet regular  -Reviewed discharge instructions and provided handout to her  -Dispo: likely home pending the above    RON Couch MD PGY6  A/P: 32y POD#1  s/p YOVANI TLSARAH, BS, sacrocolpopexy MUS, and cystoscopy.  Patient is stable and doing well for post operative course.      Neuro: PO pain meds   CV: Hemodynamically stable. EKG demonstrates NSR.  Monitor for resolution of chest pressure, likely secondary to CO2 gas insufflation. F/U AM CBC   Pulm: Saturating well on room air, will discontinue continuos pulse oxygen. Encourage oob/amb  GI: Continue regular diet  : TOV this morning. .   Heme: c/w SCDs for DVT ppx  ID: Afebrile  Endo: No active issues   Dispo: Continue routine post-op care    Karime Musa, PGY-2   -----------------------------  Urogynecology Fellow Note     Patient reports that she is doing well. Did have episode of desaturation overnight, continuous pulse ox normal throughout night.  Reports chest pressure and shoulder discomfort this morning. She denies nausea, vomiting, fevers, chills. Reports that she tolerated small amount of food last night.     VSS. UOP adequate. AM labs pending   Preop Hct 36.1 , AM labs pending     Exam:   Gen: NAD, resting comfortably in bed  CVS: RRR  Lung: no use of accessory muscles  Abdomen: Soft, nontender, nondistended. +BS. Incision sites covered with dermabond  : Christian catheter draining clear yellow urine; minimal staining on pad   Ext: SCDs on and functioning     Assessment:   Domenica is POD1 s/p TED, SCP, BS, MUS, cystoscopy doing well, reporting chest pressure and shoulder pain this morning, EKG WNL, likely secondary to gas discomfort from insufflation.     Plan:   -AM labs pending   -Will continue to monitor symptoms, encouraged her to ambulate to mobilize gas   -TOV today  -Continue diet regular  -Reviewed discharge instructions and provided handout to her  -Dispo: likely home pending the above    RON Couch MD PGY6       --------------------------------------  Urogynecology Attending Note    I personally saw and examined the patient this morning and agree with the above assessment. POD1 s/p RA-GAURI, SCPX, single incision sling and cystoscopy. Doing well this morning, chest discomfort and shoulder pain reported earlier now improved, likely secondary to post-operative gas due to insufflation. Tolerating PO, denies nausea or vomiting. Pain is well controlled. No significant vaginal bleeding. AVSS, EKG WNL, +BS, abdomen soft, non-distended, appropriately tender to palpation around incisions, which are C/D/I. Christian catheter in place draining clear urine. Adequate UOP overnight. H/H 33/11 s/p 100 mL EBL, appropriate. Continue current post-op care and monitoring. Encouraged ambulation. Pending active trial of void after which I anticipate discharge home.

## 2022-12-09 NOTE — CHART NOTE - NSCHARTNOTEFT_GEN_A_CORE
Gynecology Chart Note    Pt eval at bedside for trial of void.  Pain well controlled prior to starting TOV.  Patient has been out of bed ambulating.    The patient is c/o shoulder pain likely secondary to referred gas pain that worsens when lying down.     Bladder fully drained through varela catheter.  Retrograde filled with 300cc sterile water then clamped.    Patient assisted to restroom and varela removed.  Given necessary time to void (20 minutes).    Patient voided 300 cc of pale yellow urine, passing TOV.  To be discharged home without varela catheter after referred gas pain is resolved. Mylicon to be adminstered and ambulation encouraged.     D/w Dr. Khoa Donato Gynecology Chart Note    Pt eval at bedside for trial of void.  Pain well controlled prior to starting TOV.  Patient has been out of bed ambulating.    The patient is c/o shoulder pain likely secondary to referred gas pain that worsens when lying down.     Bladder fully drained through varela catheter.  Retrograde filled with 300cc sterile water then clamped.    Patient assisted to restroom and varela removed.  Given necessary time to void (20 minutes).    Patient voided 300 cc of pale yellow urine, passing TOV.  To be discharged home without varela catheter after referred gas pain is resolved. Mylicon to be adminstered and ambulation encouraged.     D/w Dr. Khoa Donato    Addendum @ 119p    The patient was re-evaluated after Mylicon administration and reports decreased shoulder pain.     d/w Dr. Khoa Donato NP

## 2022-12-09 NOTE — DISCHARGE NOTE NURSING/CASE MANAGEMENT/SOCIAL WORK - NSFLUVACAGEDISCH_IMM_ALL_CORE
Adult High Dose Vitamin A Pregnancy And Lactation Text: High dose vitamin A therapy is contraindicated during pregnancy and breast feeding.

## 2022-12-09 NOTE — DISCHARGE NOTE PROVIDER - HOSPITAL COURSE
Pt underwent scheduled robotic assisted hysterectomy, bilateral salpingectomy, sacrocolpopexy, mid-urethral sling, and cystoscopy on 12/8/2022. EBL was 150 cc Please see operative note for details.  During postoperative course patient's vitals were stable, vaginal bleeding appropriate, and pain well controlled.  Post operation day one hematocrit was   appropriate post-surgical change. Christian was removed on POD#1 and patient passed active trial of void. On day of discharge patient was ambulating, her pain controlled with oral medications, had adequate oral intake, and was voiding freely.  Discharge instructions and precautions were given.  Will have close follow up with Dr. Ariel Myers

## 2022-12-20 LAB — SURGICAL PATHOLOGY STUDY: SIGNIFICANT CHANGE UP

## 2022-12-21 ENCOUNTER — APPOINTMENT (OUTPATIENT)
Dept: UROGYNECOLOGY | Facility: CLINIC | Age: 32
End: 2022-12-21

## 2022-12-21 VITALS
WEIGHT: 189 LBS | HEIGHT: 65 IN | BODY MASS INDEX: 31.49 KG/M2 | SYSTOLIC BLOOD PRESSURE: 124 MMHG | DIASTOLIC BLOOD PRESSURE: 72 MMHG

## 2022-12-21 PROBLEM — N81.4 UTEROVAGINAL PROLAPSE, UNSPECIFIED: Chronic | Status: ACTIVE | Noted: 2022-11-23

## 2022-12-21 PROBLEM — O03.9 COMPLETE OR UNSPECIFIED SPONTANEOUS ABORTION WITHOUT COMPLICATION: Chronic | Status: ACTIVE | Noted: 2022-11-23

## 2022-12-21 PROBLEM — U07.1 COVID-19: Chronic | Status: ACTIVE | Noted: 2022-11-23

## 2022-12-21 PROBLEM — Z86.718 PERSONAL HISTORY OF OTHER VENOUS THROMBOSIS AND EMBOLISM: Chronic | Status: ACTIVE | Noted: 2022-11-23

## 2022-12-21 PROBLEM — N39.3 STRESS INCONTINENCE (FEMALE) (MALE): Chronic | Status: ACTIVE | Noted: 2022-11-23

## 2022-12-21 PROCEDURE — 99024 POSTOP FOLLOW-UP VISIT: CPT

## 2022-12-21 NOTE — DISCUSSION/SUMMARY
[FreeTextEntry1] : 33 yo s/p RA-GAURI, BS, sacrocolpopexy, single incision sling for POP and occult LIBRA. Doing well. Abdominal incision well-healed and vaginal incision healing with sutures in place. Good support. Continued precautions reviewed. RTO in 4 weeks.

## 2022-12-21 NOTE — SUBJECTIVE
[FreeTextEntry1] : Doing well, no complaints. No fevers, chills, nausea [FreeTextEntry8] : None [FreeTextEntry7] : Normal, some discomfort at the umbilicus for the last 3 days [FreeTextEntry6] : Normal [FreeTextEntry5] : Normal, slight increase in urgency [FreeTextEntry4] : Normal [FreeTextEntry3] : Normal [FreeTextEntry2] : Normal

## 2022-12-21 NOTE — OBJECTIVE
[Voiding Trial] : No voiding trial was performed [Post Void Residual ____ ml] : Post Void Residual was [unfilled] ml [Soft and Nontender] : soft and nontender [Clean, Dry, Intact] : Clean, Dry, Intact [Good Support] : Good support [Healing well] : healing well

## 2023-01-19 ENCOUNTER — APPOINTMENT (OUTPATIENT)
Dept: UROGYNECOLOGY | Facility: CLINIC | Age: 33
End: 2023-01-19
Payer: MEDICAID

## 2023-01-19 DIAGNOSIS — Z98.890 OTHER SPECIFIED POSTPROCEDURAL STATES: ICD-10-CM

## 2023-01-19 PROCEDURE — 99024 POSTOP FOLLOW-UP VISIT: CPT

## 2023-01-19 NOTE — HISTORY OF PRESENT ILLNESS
[FreeTextEntry1] : Domenica Couch is a 31 yo s/p RA-GAURI, BS, sacrocolpopexy, single incision sling and cystoscopy on 12/8/22. She present today 6 week post-op. She reports doing well. She denies any issues with pain. Voiding well without difficulty. Rare urgency leak when she holds too long. No stress urinary incontinence. No further prolapse symptoms. Reports some straining with bowel movements after stopping stool softeners.

## 2023-01-19 NOTE — DISCUSSION/SUMMARY
[FreeTextEntry1] : 31 yo s/p RA-GAURI, BS, sacrocolpopexy, single incision sling for POP and occult LIBRA. Doing well, excellent support. Meeting all milestones. Okay to resume all activities, including sexual intercourse. Discussed recommendation for management of constipation, as this is a risk factor for prolapse. Recommend continuing stool softeners if helpful, as well as increasing hydration, dietary fiber/supplementation.\par \par RTO in one year or PRN.

## 2023-01-19 NOTE — PHYSICAL EXAM
[FreeTextEntry1] : General: Well, appearing, no acute distress\par HEENT: Normocephalic, atraumatic\par Respiratory: Speaking in full sentences comfortably, normal work of breathing and no cough during visit\par Extremities: No upper extremity edema noted\par Skin: No obvious rash or skin lesions\par Neuro: Alert and oriented x 3, speech is fluent, normal rate\par Psych: Normal mood and affect [Mass (___ Cm)] : no ~M [unfilled] abdominal mass was palpated [Tenderness] : ~T no ~M abdominal tenderness observed [Distended] : not distended [Labia Majora] : were normal [Labia Minora] : were normal [Normal] : was normal [Normal Appearance] : general appearance was normal [Aa ____] : Aa [unfilled] [Ba ____] : Ba [unfilled] [C ____] : C [unfilled] [GH ____] : GH [unfilled] [PB ____] : PB [unfilled] [TVL ____] : TVL  [unfilled] [Ap ____] : Ap [unfilled] [Bp ____] : Bp [unfilled] [D ____] : D [unfilled] [FreeTextEntry4] : Good support

## 2023-07-31 NOTE — DISCHARGE NOTE OB - KEGEL (VAGINAL TIGHTENING) EXERCISES TO PROMOTE HEALING
Plan:     Repeat 2 week cardiac event monitor in three months. Continue taking current cardiac medications as prescribed. Follow up with me in 4 months.
Statement Selected

## 2025-03-04 NOTE — OB RN DELIVERY SUMMARY - NSSHOULDDYSTOCIA_OBGYN_ALL_OB
Detail Level: Generalized Detail Level: Zone Detail Level: Detailed Detail Level: Simple Moisturizer Recommendations: sensitive skin, unscented moisturizing cream (cerave, cetaphil, gold bond, eucerin, aveeno etc.) Cleanser Recommendations: sensitive skin gentle soap (ie Dove, cerave or cetaphil) Baby A

## (undated) DEVICE — FOLEY CATH 2-WAY 18FR 5CC LATEX HYDROGEL

## (undated) DEVICE — XI OBTURATOR OPTICAL BLADELESS 8MM

## (undated) DEVICE — SUT CLIP LAPRA-TY ABSORBABLE SIZE 0.118 TO 0.12" VIOLET

## (undated) DEVICE — SHEARS COVIDIEN ENDO SHEAR 5MM X 31CM W UNIPOLAR CAUTERY

## (undated) DEVICE — NDL HYPO SAFE 22G X 1.5" (BLACK)

## (undated) DEVICE — SYR LUER LOK 20CC

## (undated) DEVICE — XI ARM NEEDLE DRIVER MEGA

## (undated) DEVICE — XI TIP COVER

## (undated) DEVICE — XI ARM FORCEP TENACULUM

## (undated) DEVICE — GLV 7.5 PROTEXIS (WHITE)

## (undated) DEVICE — SOL IRR POUR NS 0.9% 500ML

## (undated) DEVICE — DRAPE MAYO STAND 30"

## (undated) DEVICE — SUT BIOSYN 2-0 27" V-20

## (undated) DEVICE — SOL IRR BAG H2O 3000ML

## (undated) DEVICE — LUBRICANT INST ELECTROLUBE Z SOLUTION

## (undated) DEVICE — XI ARM NEEDLE DRIVER LARGE

## (undated) DEVICE — MEDICATION LABELS W MARKER

## (undated) DEVICE — TUBING TUR 2 PRONG

## (undated) DEVICE — SUCTION YANKAUER NO CONTROL VENT

## (undated) DEVICE — XI ARM FORCEP FENESTRATED BIPOLAR 8MM

## (undated) DEVICE — SUT POLYSORB 2-0 18" V-20

## (undated) DEVICE — PACK GYN LAPAROSCOPY

## (undated) DEVICE — PREP CHLOROHEXIDINE 4% 118CC KIT

## (undated) DEVICE — XI DRAPE COLUMN

## (undated) DEVICE — FOLEY HOLDER STATLOCK 2 WAY ADULT

## (undated) DEVICE — DRAPE LIGHT HANDLE COVER (BLUE)

## (undated) DEVICE — DRAPE INSTRUMENT POUCH 6.75" X 11"

## (undated) DEVICE — GLV 8.5 PROTEXIS (WHITE)

## (undated) DEVICE — SPECIMEN CONTAINER 100ML

## (undated) DEVICE — TUBING AIRSEAL TRI-LUMEN FILTERED

## (undated) DEVICE — LAP PAD 4 X 18"

## (undated) DEVICE — POSITIONER PINK PAD PIGAZZI SYSTEM

## (undated) DEVICE — POSITIONER FOAM EGG CRATE ULNAR 2PCS (PINK)

## (undated) DEVICE — XI DRAPE ARM

## (undated) DEVICE — SUT MAXON 2-0 27" T-5

## (undated) DEVICE — SUT POLYSORB 2-0 30" V-20 UNDYED

## (undated) DEVICE — SUT POLYSORB 0 30" GS-23

## (undated) DEVICE — TROCAR SURGIQUEST AIRSEAL 8MMX100MM

## (undated) DEVICE — STAPLER SKIN VISI-STAT 35 WIDE

## (undated) DEVICE — ENDOCATCH 10MM SPECIMEN POUCH

## (undated) DEVICE — XI ARM FORCEP MARYLAND BIPOLAR

## (undated) DEVICE — WARMING BLANKET UPPER ADULT

## (undated) DEVICE — SOL IRR POUR H2O 250ML

## (undated) DEVICE — PREP BETADINE SPONGE STICKS

## (undated) DEVICE — SUT GORETEX CV-2 (0) 36" PH-24

## (undated) DEVICE — PACK CYSTO

## (undated) DEVICE — GLV 6.5 PROTEXIS (WHITE)

## (undated) DEVICE — GOWN TRIMAX LG

## (undated) DEVICE — DRAPE 3/4 SHEET W REINFORCEMENT 56X77"

## (undated) DEVICE — POSITIONER PURPLE ARM ONE STEP (LARGE)

## (undated) DEVICE — LONE STAR RETRACTOR RING 32.5CM X 18.3CM DISP

## (undated) DEVICE — ELCTR BOVIE PENCIL SMOKE EVACUATION

## (undated) DEVICE — DRSG STERISTRIPS 0.5 X 4"

## (undated) DEVICE — BLADE SCALPEL SAFETYLOCK #15

## (undated) DEVICE — FOLEY TRAY 16FR LF URINE METER SURESTEP

## (undated) DEVICE — DRAPE 1/2 SHEET 40X57"

## (undated) DEVICE — DRAPE TOWEL BLUE 17" X 24"

## (undated) DEVICE — FOLEY CATH 2-WAY 18FR 5CC SILICONE

## (undated) DEVICE — SUT TICRON 2-0 30" GS22

## (undated) DEVICE — PREP CHLORAPREP HI-LITE ORANGE 26ML

## (undated) DEVICE — SOL IRR BAG NS 0.9% 3000ML

## (undated) DEVICE — XI ARM GRASPER TIP UP FENESTRATED

## (undated) DEVICE — APPLICATOR SURGICEL LAP TROCAR POINT 2.5MM X 150MM

## (undated) DEVICE — SUT BIOSYN 4-0 18" P-12

## (undated) DEVICE — SYR LUER LOK 10CC

## (undated) DEVICE — TUBING SUCTION 20FT

## (undated) DEVICE — TUBING STRYKEFLOW II SUCTION / IRRIGATOR

## (undated) DEVICE — DRSG KLING 4"

## (undated) DEVICE — XI ARM SCISSOR MONO CURVED

## (undated) DEVICE — ENDOCATCH II 15MM

## (undated) DEVICE — LONE STAR ELASTIC STAY HOOK 5MM SHARP

## (undated) DEVICE — XI SEAL UNIV 5- 8 MM

## (undated) DEVICE — SUT PDS II 2-0 27" SH

## (undated) DEVICE — D HELP - CLEARVIEW CLEARIFY SYSTEM

## (undated) DEVICE — GLV 8 PROTEXIS (WHITE)

## (undated) DEVICE — NDL COUNTER FOAM AND MAGNET 40-70

## (undated) DEVICE — VENODYNE/SCD SLEEVE CALF LARGE

## (undated) DEVICE — MARKING PEN W RULER

## (undated) DEVICE — PREP BETADINE KIT

## (undated) DEVICE — FOLEY CATH 2-WAY 16FR 5CC LATEX LUBRICATH

## (undated) DEVICE — XI ARM NEEDLE DRIVER SUTURECUT MEGA 8MM

## (undated) DEVICE — GLV 7 PROTEXIS (WHITE)

## (undated) DEVICE — NDL HYPO REGULAR BEVEL 22G X 1.5" (TURQUOISE)